# Patient Record
Sex: FEMALE | Race: WHITE | HISPANIC OR LATINO | Employment: FULL TIME
[De-identification: names, ages, dates, MRNs, and addresses within clinical notes are randomized per-mention and may not be internally consistent; named-entity substitution may affect disease eponyms.]

---

## 2017-01-02 ENCOUNTER — HOSPITAL (OUTPATIENT)
Dept: OTHER | Age: 27
End: 2017-01-02
Attending: FAMILY MEDICINE

## 2017-04-15 ENCOUNTER — HOSPITAL (OUTPATIENT)
Dept: OTHER | Age: 27
End: 2017-04-15
Attending: FAMILY MEDICINE

## 2017-04-15 LAB
CHLAMYDIA PROBE: NEGATIVE
CHLAMYDIA/GC SOURCE: NORMAL
CONTROL LINE: PRESENT
CONTROL LINE: PRESENT
GC PROBE: NEGATIVE
Lab: CLEAR
Lab: CLEAR
N GON SOURCE: NORMAL
TRICH POC: NORMAL
UA APPEAR POC: ABNORMAL
UA BILI POC: NEGATIVE
UA BLOOD POC: NEGATIVE
UA COLOR POC: YELLOW
UA GLUCOSE POC: NEGATIVE
UA KETONES POC: NEGATIVE
UA LEUK EST POC: ABNORMAL
UA NITRITE POC: NEGATIVE
UA PH POC: 6 (ref 5–7)
UA PROTEIN POC: NEGATIVE
UA SPEC GRAV POC: 1.02 (ref 1.01–1.02)
UA UROBILIN POC: 0.2 MG/DL
URINE PREG POC: NEGATIVE

## 2017-04-16 ENCOUNTER — HOSPITAL (OUTPATIENT)
Dept: OTHER | Age: 27
End: 2017-04-16
Attending: PHYSICIAN ASSISTANT

## 2017-04-16 LAB
CONTROL LINE: PRESENT
Lab: CLEAR
UA APPEAR: CLEAR
UA BACTERIA: ABNORMAL
UA BILI: NEGATIVE
UA BLOOD: NEGATIVE
UA COLOR: YELLOW
UA EPITHELIAL: ABNORMAL
UA GLUCOSE: NEGATIVE
UA KETONES: ABNORMAL
UA LEUK EST: ABNORMAL
UA NITRITE: NEGATIVE
UA PH: 6.5 (ref 5–7)
UA PROTEIN: NEGATIVE
UA RBC: ABNORMAL
UA SPEC GRAV: 1.02 (ref 1.01–1.02)
UA UROBILINOGEN: 0.2 MG/DL (ref 0.2–1)
UA WBC: ABNORMAL
URINE PREG POC: NEGATIVE

## 2017-09-18 ENCOUNTER — HOSPITAL (OUTPATIENT)
Dept: OTHER | Age: 27
End: 2017-09-18
Attending: FAMILY MEDICINE

## 2017-09-18 LAB
CHLAMYDIA PROBE: NEGATIVE
CHLAMYDIA/GC SOURCE: NORMAL
CONTROL LINE: PRESENT
CONTROL LINE: PRESENT
GC PROBE: NEGATIVE
Lab: CLEAR
Lab: CLEAR
N GON SOURCE: NORMAL
TRICH POC: NORMAL
UA APPEAR POC: ABNORMAL
UA BILI POC: NEGATIVE
UA BLOOD POC: ABNORMAL
UA COLOR POC: YELLOW
UA GLUCOSE POC: NEGATIVE
UA KETONES POC: NEGATIVE
UA LEUK EST POC: ABNORMAL
UA NITRITE POC: NEGATIVE
UA PH POC: 6 (ref 5–7)
UA PROTEIN POC: NEGATIVE
UA SPEC GRAV POC: 1.02 (ref 1.01–1.02)
UA UROBILIN POC: 0.2 MG/DL
URINE PREG POC: NEGATIVE

## 2017-12-29 ENCOUNTER — HOSPITAL (OUTPATIENT)
Dept: OTHER | Age: 27
End: 2017-12-29
Attending: INTERNAL MEDICINE

## 2018-02-11 ENCOUNTER — HOSPITAL (OUTPATIENT)
Dept: OTHER | Age: 28
End: 2018-02-11
Attending: NURSE PRACTITIONER

## 2018-02-11 LAB
CHLAMYDIA PROBE: NEGATIVE
CHLAMYDIA/GC SOURCE: NORMAL
CLUE CELLS: PRESENT
CONTROL LINE: PRESENT
GC PROBE: NEGATIVE
Lab: CLEAR
N GON SOURCE: NORMAL
TRICHOMONAS: ABNORMAL
UA APPEAR: CLEAR
UA BACTERIA: ABNORMAL
UA BILI: NEGATIVE
UA BLOOD: NEGATIVE
UA COLOR: YELLOW
UA EPITHELIAL: ABNORMAL
UA GLUCOSE: NEGATIVE
UA KETONES: NEGATIVE
UA LEUK EST: ABNORMAL
UA NITRITE: NEGATIVE
UA PH: 6 (ref 5–7)
UA PROTEIN: NEGATIVE
UA RBC: ABNORMAL
UA SPEC GRAV: <=1.005 (ref 1.01–1.02)
UA UROBILINOGEN: 0.2 MG/DL (ref 0.2–1)
UA WBC: ABNORMAL
URINE PREG POC: NEGATIVE
WBC, WET PREP: ABNORMAL
YEAST, WET PREP: ABNORMAL

## 2018-07-12 PROBLEM — Z98.890 HISTORY OF LOOP ELECTRICAL EXCISION PROCEDURE (LEEP): Status: ACTIVE | Noted: 2017-08-21

## 2018-07-12 PROBLEM — G43.829 MENSTRUAL MIGRAINE: Status: ACTIVE | Noted: 2018-02-15

## 2018-07-12 PROBLEM — F17.200 SMOKER: Status: ACTIVE | Noted: 2017-10-06

## 2018-07-12 PROBLEM — R87.610 ASCUS OF CERVIX WITH NEGATIVE HIGH RISK HPV: Status: ACTIVE | Noted: 2017-11-16

## 2019-05-20 ENCOUNTER — HOSPITAL (OUTPATIENT)
Dept: OTHER | Age: 29
End: 2019-05-20
Attending: PHYSICIAN ASSISTANT

## 2019-05-20 LAB
A/G RATIO_: 0.8
ABS LYMPH: 1.9 K/CUMM (ref 1–3.5)
ABS MONO: 0.7 K/CUMM (ref 0.1–0.8)
ABS NEUTRO: 5.9 K/CUMM (ref 2–8)
ALBUMIN: 3.5 G/DL (ref 3.5–5)
ALK PHOS: 103 UNIT/L (ref 50–124)
ALT/GPT: 16 UNIT/L (ref 0–55)
ANION GAP SERPL CALC-SCNC: 13 MEQ/L (ref 10–20)
AST/GOT: 14 UNIT/L (ref 5–34)
BASOPHIL: 0 % (ref 0–1)
BILI TOTAL: 0.2 MG/DL (ref 0.2–1)
BUN SERPL-MCNC: 11 MG/DL (ref 6–20)
C DIFF COMMENT: NORMAL
C DIFF TOXIN PCR: NOT DETECTED
CALCIUM: 9.7 MG/DL (ref 8.4–10.2)
CHLORIDE: 106 MEQ/L (ref 97–107)
CONTROL LINE: PRESENT
CONTROL LINE: PRESENT
CREATININE: 0.68 MG/DL (ref 0.6–1.3)
DIFF_TYPE?: NORMAL
EOSINOPHIL: 5 % (ref 0–6)
GLOBULIN_: 4.2 G/DL (ref 2–4.1)
GLUCOSE LVL: 110 MG/DL (ref 70–99)
GROSS: NORMAL
GROSS: NORMAL
HCT VFR BLD CALC: 37 % (ref 33–45)
HEMOLYSIS 2+: NEGATIVE
HGB BLD-MCNC: 12.1 G/DL (ref 11–15)
ICTERIC 4+: NEGATIVE
IMMATURE GRAN: 0.3 % (ref 0–0.3)
INSTR WBC: 8.9 K/CUMM (ref 4–11)
LIPASE LEVEL: 19 UNIT/L (ref 8–78)
LIPEMIC 3+: NEGATIVE
LYMPHOCYTE: 21 %
Lab: CLEAR
MCH RBC QN AUTO: 28 PG (ref 25–35)
MCHC RBC AUTO-ENTMCNC: 33 G/DL (ref 32–37)
MCV RBC AUTO: 85 FL (ref 78–97)
MONOCYTE: 8 %
NEUTROPHIL: 66 %
NRBC BLD MANUAL-RTO: 0 % (ref 0–0.2)
PLATELET: 392 K/CUMM (ref 150–450)
POTASSIUM: 3.8 MEQ/L (ref 3.5–5.1)
RBC # BLD: 4.34 M/CUMM (ref 3.7–5.2)
RDW: 13.1 % (ref 11.5–14.5)
ROTAVIRUS: NEGATIVE
SODIUM: 137 MEQ/L (ref 136–145)
STL COLOR: NORMAL
STOOL RBC: NORMAL
STOOL WBC: NORMAL
TCO2: 22 MEQ/L (ref 19–29)
TOTAL PROTEIN: 7.7 G/DL (ref 6.4–8.3)
UA APPEAR: CLEAR
UA BACTERIA: ABNORMAL
UA BILI: NEGATIVE
UA BLOOD: ABNORMAL
UA COLOR: YELLOW
UA EPITHELIAL: ABNORMAL
UA GLUCOSE: NEGATIVE
UA KETONES: NEGATIVE
UA LEUK EST: NEGATIVE
UA NITRITE: NEGATIVE
UA PH: 6 (ref 5–7)
UA PROTEIN: NEGATIVE
UA RBC: ABNORMAL
UA SPEC GRAV: 1.02 (ref 1.01–1.02)
UA UROBILINOGEN: 0.2 MG/DL (ref 0.2–1)
UA WBC: ABNORMAL
URINE PREG POC: NEGATIVE
WBC # BLD: 8.9 K/CUMM (ref 4–11)

## 2019-08-21 ENCOUNTER — HOSPITAL (OUTPATIENT)
Dept: OTHER | Age: 29
End: 2019-08-21
Attending: EMERGENCY MEDICINE

## 2020-01-05 ENCOUNTER — HOSPITAL (OUTPATIENT)
Dept: OTHER | Age: 30
End: 2020-01-05
Attending: PHYSICIAN ASSISTANT

## 2020-01-05 LAB
CONTROL LINE: PRESENT
Lab: CLEAR
UA APPEAR POC: CLEAR
UA BILI POC: NEGATIVE
UA BLOOD POC: NEGATIVE
UA COLOR POC: YELLOW
UA GLUCOSE POC: NEGATIVE
UA KETONES POC: NEGATIVE
UA LEUK EST POC: NEGATIVE
UA NITRITE POC: NEGATIVE
UA PH POC: 8.5 (ref 5–7)
UA PROTEIN POC: NEGATIVE
UA SPEC GRAV POC: 1.02 (ref 1.01–1.02)
UA UROBILIN POC: 0.2 MG/DL
URINE PREG POC: NEGATIVE

## 2020-03-06 ENCOUNTER — HOSPITAL (OUTPATIENT)
Dept: OTHER | Age: 30
End: 2020-03-06
Attending: NURSE PRACTITIONER

## 2020-03-06 LAB
CHLAMYDIA PROBE: POSITIVE
CONTROL LINE: PRESENT
CONTROL LINE: PRESENT
GC PROBE: NEGATIVE
HSV PCR SOURCE: NORMAL
HSV TYPE 1: NOT DETECTED
HSV TYPE 2: NOT DETECTED
Lab: CLEAR
Lab: CLEAR
N GON SOURCE: ABNORMAL
TRICH POC: NORMAL
UA APPEAR POC: ABNORMAL
UA BILI POC: ABNORMAL
UA BLOOD POC: NEGATIVE
UA COLOR POC: ABNORMAL
UA GLUCOSE POC: NEGATIVE
UA KETONES POC: ABNORMAL
UA LEUK EST POC: NEGATIVE
UA NITRITE POC: NEGATIVE
UA PH POC: 5.5 (ref 5–7)
UA PROTEIN POC: NEGATIVE
UA SPEC GRAV POC: 1.02 (ref 1.01–1.02)
UA UROBILIN POC: 0.2 MG/DL
URINE PREG POC: NEGATIVE

## 2020-11-15 ENCOUNTER — HOSPITAL (OUTPATIENT)
Dept: OTHER | Age: 30
End: 2020-11-15
Attending: INTERNAL MEDICINE

## 2021-01-20 ENCOUNTER — WALK IN (OUTPATIENT)
Dept: URGENT CARE | Age: 31
End: 2021-01-20
Attending: EMERGENCY MEDICINE

## 2021-01-20 DIAGNOSIS — G44.229 CHRONIC TENSION-TYPE HEADACHE, NOT INTRACTABLE: Primary | ICD-10-CM

## 2021-01-20 PROCEDURE — 10002800 HB RX 250 W HCPCS: Performed by: EMERGENCY MEDICINE

## 2021-01-20 PROCEDURE — 99203 OFFICE O/P NEW LOW 30 MIN: CPT

## 2021-01-20 PROCEDURE — 96372 THER/PROPH/DIAG INJ SC/IM: CPT

## 2021-01-20 RX ORDER — HYDROCODONE BITARTRATE AND ACETAMINOPHEN 5; 325 MG/1; MG/1
TABLET ORAL
Qty: 15 TABLET | Refills: 0 | Status: SHIPPED | OUTPATIENT
Start: 2021-01-20

## 2021-01-20 RX ADMIN — KETOROLAC TROMETHAMINE 60 MG: 30 INJECTION, SOLUTION INTRAMUSCULAR; INTRAVENOUS at 12:08

## 2021-01-20 ASSESSMENT — ENCOUNTER SYMPTOMS
ABDOMINAL PAIN: 0
CONFUSION: 0
COUGH: 0
BACK PAIN: 0
CHILLS: 0
SHORTNESS OF BREATH: 0
SINUS PAIN: 0
HEADACHES: 1
VOMITING: 0
NAUSEA: 0
FEVER: 0

## 2021-01-20 ASSESSMENT — PAIN SCALES - GENERAL: PAINLEVEL: 9-10

## 2021-01-30 ENCOUNTER — APPOINTMENT (OUTPATIENT)
Dept: ULTRASOUND IMAGING | Age: 31
End: 2021-01-30

## 2021-01-30 ENCOUNTER — APPOINTMENT (OUTPATIENT)
Dept: CT IMAGING | Age: 31
End: 2021-01-30

## 2021-01-30 ENCOUNTER — HOSPITAL ENCOUNTER (EMERGENCY)
Age: 31
Discharge: HOME | End: 2021-01-30

## 2021-01-30 VITALS
HEART RATE: 80 BPM | TEMPERATURE: 97.5 F | RESPIRATION RATE: 15 BRPM | SYSTOLIC BLOOD PRESSURE: 105 MMHG | OXYGEN SATURATION: 100 % | DIASTOLIC BLOOD PRESSURE: 59 MMHG | BODY MASS INDEX: 38.57 KG/M2 | WEIGHT: 240 LBS | HEIGHT: 66 IN

## 2021-01-30 DIAGNOSIS — K59.00 CONSTIPATION, UNSPECIFIED CONSTIPATION TYPE: ICD-10-CM

## 2021-01-30 DIAGNOSIS — R10.32 LEFT LOWER QUADRANT ABDOMINAL PAIN: Primary | ICD-10-CM

## 2021-01-30 LAB
ALBUMIN SERPL-MCNC: 3.6 G/DL (ref 3.6–5.1)
ALBUMIN/GLOB SERPL: 0.8 {RATIO} (ref 1–2.4)
ALP SERPL-CCNC: 96 UNITS/L (ref 45–117)
ALT SERPL-CCNC: 35 UNITS/L
ANION GAP SERPL CALC-SCNC: 15 MMOL/L (ref 10–20)
APPEARANCE UR: CLEAR
AST SERPL-CCNC: 14 UNITS/L
BACTERIA #/AREA URNS HPF: ABNORMAL /HPF
BASOPHILS # BLD: 0.1 K/MCL (ref 0–0.3)
BASOPHILS NFR BLD: 1 %
BILIRUB SERPL-MCNC: 0.2 MG/DL (ref 0.2–1)
BILIRUB UR QL STRIP: NEGATIVE
BUN SERPL-MCNC: 13 MG/DL (ref 6–20)
BUN/CREAT SERPL: 24 (ref 7–25)
CALCIUM SERPL-MCNC: 8.8 MG/DL (ref 8.4–10.2)
CHLORIDE SERPL-SCNC: 101 MMOL/L (ref 98–107)
CO2 SERPL-SCNC: 27 MMOL/L (ref 21–32)
COLOR UR: YELLOW
CREAT SERPL-MCNC: 0.55 MG/DL (ref 0.51–0.95)
DEPRECATED RDW RBC: 42.1 FL (ref 39–50)
EOSINOPHIL # BLD: 0.5 K/MCL (ref 0–0.5)
EOSINOPHIL NFR BLD: 5 %
ERYTHROCYTE [DISTWIDTH] IN BLOOD: 12.7 % (ref 11–15)
FASTING DURATION TIME PATIENT: ABNORMAL H
GFR SERPLBLD BASED ON 1.73 SQ M-ARVRAT: >90 ML/MIN/1.73M2
GLOBULIN SER-MCNC: 4.6 G/DL (ref 2–4)
GLUCOSE SERPL-MCNC: 88 MG/DL (ref 65–99)
GLUCOSE UR STRIP-MCNC: NEGATIVE MG/DL
HCG UR QL: NEGATIVE
HCT VFR BLD CALC: 42.7 % (ref 36–46.5)
HGB BLD-MCNC: 13.8 G/DL (ref 12–15.5)
HGB UR QL STRIP: ABNORMAL
HYALINE CASTS #/AREA URNS LPF: ABNORMAL /LPF
IMM GRANULOCYTES # BLD AUTO: 0 K/MCL (ref 0–0.2)
IMM GRANULOCYTES # BLD: 0 %
KETONES UR STRIP-MCNC: NEGATIVE MG/DL
LEUKOCYTE ESTERASE UR QL STRIP: NEGATIVE
LIPASE SERPL-CCNC: 62 UNITS/L (ref 73–393)
LYMPHOCYTES # BLD: 3.1 K/MCL (ref 1–4.8)
LYMPHOCYTES NFR BLD: 30 %
MCH RBC QN AUTO: 29.2 PG (ref 26–34)
MCHC RBC AUTO-ENTMCNC: 32.3 G/DL (ref 32–36.5)
MCV RBC AUTO: 90.3 FL (ref 78–100)
MONOCYTES # BLD: 0.8 K/MCL (ref 0.3–0.9)
MONOCYTES NFR BLD: 8 %
NEUTROPHILS # BLD: 6 K/MCL (ref 1.8–7.7)
NEUTROPHILS NFR BLD: 56 %
NITRITE UR QL STRIP: NEGATIVE
NRBC BLD MANUAL-RTO: 0 /100 WBC
PH UR STRIP: 6 [PH] (ref 5–7)
PLATELET # BLD AUTO: 425 K/MCL (ref 140–450)
POTASSIUM SERPL-SCNC: 3.9 MMOL/L (ref 3.4–5.1)
PROT SERPL-MCNC: 8.2 G/DL (ref 6.4–8.2)
PROT UR STRIP-MCNC: NEGATIVE MG/DL
RBC # BLD: 4.73 MIL/MCL (ref 4–5.2)
RBC #/AREA URNS HPF: ABNORMAL /HPF
SODIUM SERPL-SCNC: 139 MMOL/L (ref 135–145)
SP GR UR STRIP: <1.005 (ref 1–1.03)
SQUAMOUS #/AREA URNS HPF: ABNORMAL /HPF
UROBILINOGEN UR STRIP-MCNC: 0.2 MG/DL
WBC # BLD: 10.5 K/MCL (ref 4.2–11)
WBC #/AREA URNS HPF: ABNORMAL /HPF

## 2021-01-30 PROCEDURE — 83690 ASSAY OF LIPASE: CPT | Performed by: NURSE PRACTITIONER

## 2021-01-30 PROCEDURE — 10002807 HB RX 258: Performed by: NURSE PRACTITIONER

## 2021-01-30 PROCEDURE — 84703 CHORIONIC GONADOTROPIN ASSAY: CPT

## 2021-01-30 PROCEDURE — 74177 CT ABD & PELVIS W/CONTRAST: CPT

## 2021-01-30 PROCEDURE — 96374 THER/PROPH/DIAG INJ IV PUSH: CPT

## 2021-01-30 PROCEDURE — 76830 TRANSVAGINAL US NON-OB: CPT

## 2021-01-30 PROCEDURE — 96361 HYDRATE IV INFUSION ADD-ON: CPT

## 2021-01-30 PROCEDURE — 76856 US EXAM PELVIC COMPLETE: CPT

## 2021-01-30 PROCEDURE — 85025 COMPLETE CBC W/AUTO DIFF WBC: CPT | Performed by: NURSE PRACTITIONER

## 2021-01-30 PROCEDURE — 80053 COMPREHEN METABOLIC PANEL: CPT | Performed by: NURSE PRACTITIONER

## 2021-01-30 PROCEDURE — 96375 TX/PRO/DX INJ NEW DRUG ADDON: CPT

## 2021-01-30 PROCEDURE — 81001 URINALYSIS AUTO W/SCOPE: CPT | Performed by: NURSE PRACTITIONER

## 2021-01-30 PROCEDURE — 99284 EMERGENCY DEPT VISIT MOD MDM: CPT

## 2021-01-30 PROCEDURE — 10002805 HB CONTRAST AGENT: Performed by: NURSE PRACTITIONER

## 2021-01-30 PROCEDURE — 10002800 HB RX 250 W HCPCS: Performed by: NURSE PRACTITIONER

## 2021-01-30 RX ORDER — ONDANSETRON 2 MG/ML
4 INJECTION INTRAMUSCULAR; INTRAVENOUS ONCE
Status: COMPLETED | OUTPATIENT
Start: 2021-01-30 | End: 2021-01-30

## 2021-01-30 RX ORDER — POLYETHYLENE GLYCOL 3350 17 G/17G
17 POWDER, FOR SOLUTION ORAL DAILY
Qty: 255 G | Refills: 0 | Status: SHIPPED | OUTPATIENT
Start: 2021-01-30 | End: 2021-02-06

## 2021-01-30 RX ADMIN — ONDANSETRON 4 MG: 2 INJECTION INTRAMUSCULAR; INTRAVENOUS at 14:31

## 2021-01-30 RX ADMIN — IOHEXOL 500 ML: 350 INJECTION, SOLUTION INTRAVENOUS at 15:45

## 2021-01-30 RX ADMIN — SODIUM CHLORIDE 1000 ML: 9 INJECTION, SOLUTION INTRAVENOUS at 14:30

## 2021-01-30 RX ADMIN — MORPHINE SULFATE 4 MG: 4 INJECTION INTRAVENOUS at 14:31

## 2021-01-30 ASSESSMENT — ENCOUNTER SYMPTOMS
FATIGUE: 0
EYE PAIN: 0
NAUSEA: 0
ACTIVITY CHANGE: 0
WOUND: 0
RHINORRHEA: 0
EYE REDNESS: 0
FEVER: 0
ABDOMINAL PAIN: 1
CONSTIPATION: 0
TROUBLE SWALLOWING: 0
SHORTNESS OF BREATH: 0
APPETITE CHANGE: 0
CHEST TIGHTNESS: 0
DIZZINESS: 0
EYE ITCHING: 0
DIARRHEA: 0
BACK PAIN: 0
EYE DISCHARGE: 0
SORE THROAT: 0
VOMITING: 0
CHILLS: 0
HEADACHES: 0
COUGH: 0
COLOR CHANGE: 0

## 2021-01-30 ASSESSMENT — PAIN SCALES - GENERAL
PAINLEVEL_OUTOF10: 7
PAINLEVEL_OUTOF10: 7

## 2021-01-30 ASSESSMENT — PAIN DESCRIPTION - PAIN TYPE: TYPE: ACUTE PAIN

## 2021-04-20 ENCOUNTER — EMPLOYEE HEALTH (OUTPATIENT)
Dept: OTHER | Facility: HOSPITAL | Age: 31
End: 2021-04-20
Attending: PREVENTIVE MEDICINE

## 2021-04-20 DIAGNOSIS — Z11.1 SCREENING-PULMONARY TB: Primary | ICD-10-CM

## 2021-04-20 PROCEDURE — 86480 TB TEST CELL IMMUN MEASURE: CPT

## 2021-05-26 VITALS
DIASTOLIC BLOOD PRESSURE: 84 MMHG | HEART RATE: 78 BPM | WEIGHT: 250 LBS | OXYGEN SATURATION: 99 % | SYSTOLIC BLOOD PRESSURE: 131 MMHG | HEIGHT: 66 IN | TEMPERATURE: 98 F | RESPIRATION RATE: 18 BRPM | BODY MASS INDEX: 40.18 KG/M2

## 2021-06-08 ENCOUNTER — OFFICE VISIT (OUTPATIENT)
Dept: OBGYN CLINIC | Facility: CLINIC | Age: 31
End: 2021-06-08
Payer: COMMERCIAL

## 2021-06-08 VITALS
WEIGHT: 277.63 LBS | DIASTOLIC BLOOD PRESSURE: 70 MMHG | BODY MASS INDEX: 43.57 KG/M2 | HEIGHT: 67 IN | SYSTOLIC BLOOD PRESSURE: 114 MMHG | RESPIRATION RATE: 18 BRPM | HEART RATE: 86 BPM

## 2021-06-08 DIAGNOSIS — Z98.890 HISTORY OF LOOP ELECTRICAL EXCISION PROCEDURE (LEEP): ICD-10-CM

## 2021-06-08 DIAGNOSIS — Z87.42 HX OF MENORRHAGIA: Primary | ICD-10-CM

## 2021-06-08 PROCEDURE — 87591 N.GONORRHOEAE DNA AMP PROB: CPT | Performed by: OBSTETRICS & GYNECOLOGY

## 2021-06-08 PROCEDURE — 3074F SYST BP LT 130 MM HG: CPT | Performed by: OBSTETRICS & GYNECOLOGY

## 2021-06-08 PROCEDURE — 3078F DIAST BP <80 MM HG: CPT | Performed by: OBSTETRICS & GYNECOLOGY

## 2021-06-08 PROCEDURE — 87491 CHLMYD TRACH DNA AMP PROBE: CPT | Performed by: OBSTETRICS & GYNECOLOGY

## 2021-06-08 PROCEDURE — 99203 OFFICE O/P NEW LOW 30 MIN: CPT | Performed by: OBSTETRICS & GYNECOLOGY

## 2021-06-08 PROCEDURE — 3008F BODY MASS INDEX DOCD: CPT | Performed by: OBSTETRICS & GYNECOLOGY

## 2021-06-08 RX ORDER — TRANEXAMIC ACID 650 1/1
1300 TABLET ORAL 3 TIMES DAILY
Qty: 30 TABLET | Refills: 3 | Status: SHIPPED | OUTPATIENT
Start: 2021-06-08 | End: 2021-06-13

## 2021-06-08 RX ORDER — FERROUS SULFATE TAB EC 324 MG (65 MG FE EQUIVALENT) 324 (65 FE) MG
TABLET DELAYED RESPONSE ORAL
COMMUNITY
End: 2021-10-06

## 2021-06-08 NOTE — PROGRESS NOTES
Ronnie Stevens is a 35year old female  Patient's last menstrual period was 2021 (exact date). Patient presents with:  New Patient: vaginal bleeding/clots. per pt, 4 menses in last 2 months  .      She used to have a period every month she will bl use: Never      Sexual activity: Not Currently        Partners: Male    Other Topics      Concerns:         Service: Not Asked        Blood Transfusions: Not Asked        Caffeine Concern: No        Occupational Exposure: Not Asked        Reyes Braga pain, diarrhea or constipation  Genitourinary:  denies dysuria, incontinence, abnormal vaginal discharge, vaginal itching  Skin/Breast:  Denies any breast pain, lumps, or discharge. Neurological:  denies headaches, extremity weakness or numbness.       Cheikh 30

## 2021-06-11 ENCOUNTER — TELEPHONE (OUTPATIENT)
Dept: OBGYN CLINIC | Facility: CLINIC | Age: 31
End: 2021-06-11

## 2021-06-11 NOTE — TELEPHONE ENCOUNTER
Spoke with patient. She wanted to know the results of her CBC and TSH. I informed her she has not had them drawn. She states her visit was extremely confusing and odd. She states she is celibate and told the doctor she did not need a pap or STD testing.  Sh

## 2021-06-29 ENCOUNTER — TELEPHONE (OUTPATIENT)
Dept: OBGYN CLINIC | Facility: CLINIC | Age: 31
End: 2021-06-29

## 2021-06-29 NOTE — TELEPHONE ENCOUNTER
Pt states that she is having two periods a month and feels like dr. Eh Mcdonald did not help her with her issue, She also states that her pain is becoming unbearable. She would like to be seen ASAP and also wants to discuss the medication she was prescribed.

## 2021-06-29 NOTE — TELEPHONE ENCOUNTER
Pt questions answered. Pt requests appointment with Dr. Samir Jordan, she came highly recommended by coworker. First available offered and scheduled. Pt advised to continue Lysteda to stop bleeding as prescribed. Understanding verbalized.

## 2021-07-21 ENCOUNTER — WALK IN (OUTPATIENT)
Dept: URGENT CARE | Age: 31
End: 2021-07-21

## 2021-07-21 VITALS
SYSTOLIC BLOOD PRESSURE: 114 MMHG | HEART RATE: 72 BPM | RESPIRATION RATE: 16 BRPM | HEIGHT: 66 IN | BODY MASS INDEX: 41.78 KG/M2 | TEMPERATURE: 98.3 F | OXYGEN SATURATION: 97 % | WEIGHT: 260 LBS | DIASTOLIC BLOOD PRESSURE: 82 MMHG

## 2021-07-21 DIAGNOSIS — H61.23 EXCESSIVE CERUMEN IN BOTH EAR CANALS: Primary | ICD-10-CM

## 2021-07-21 PROCEDURE — 99212 OFFICE O/P EST SF 10 MIN: CPT | Performed by: PHYSICIAN ASSISTANT

## 2021-07-21 ASSESSMENT — ENCOUNTER SYMPTOMS
SORE THROAT: 0
RHINORRHEA: 0
CHILLS: 0
FEVER: 0
HEADACHES: 0

## 2021-07-22 ENCOUNTER — HOSPITAL ENCOUNTER (OUTPATIENT)
Age: 31
Discharge: HOME | End: 2021-07-22
Attending: EMERGENCY MEDICINE
Payer: COMMERCIAL

## 2021-07-22 VITALS
TEMPERATURE: 98 F | OXYGEN SATURATION: 97 % | SYSTOLIC BLOOD PRESSURE: 120 MMHG | DIASTOLIC BLOOD PRESSURE: 84 MMHG | HEART RATE: 84 BPM | RESPIRATION RATE: 16 BRPM

## 2021-07-22 DIAGNOSIS — N76.0 VAGINITIS AND VULVOVAGINITIS: Primary | ICD-10-CM

## 2021-07-22 LAB
B-HCG UR QL: NEGATIVE
B-HCG UR QL: NEGATIVE
POCT BILIRUBIN URINE: NEGATIVE
POCT GLUCOSE URINE: NEGATIVE MG/DL
POCT KETONE URINE: NEGATIVE MG/DL
POCT NITRITE URINE: NEGATIVE
POCT PH URINE: 6.5 (ref 5–8)
POCT PROTEIN URINE: NEGATIVE MG/DL
POCT SPECIFIC GRAVITY URINE: 1.01
POCT URINE CLARITY: CLEAR
POCT URINE COLOR: YELLOW
POCT UROBILINOGEN URINE: 0.2 MG/DL

## 2021-07-22 PROCEDURE — 87491 CHLMYD TRACH DNA AMP PROBE: CPT | Performed by: EMERGENCY MEDICINE

## 2021-07-22 PROCEDURE — 87660 TRICHOMONAS VAGIN DIR PROBE: CPT | Performed by: EMERGENCY MEDICINE

## 2021-07-22 PROCEDURE — 87480 CANDIDA DNA DIR PROBE: CPT | Performed by: EMERGENCY MEDICINE

## 2021-07-22 PROCEDURE — 87591 N.GONORRHOEAE DNA AMP PROB: CPT | Performed by: EMERGENCY MEDICINE

## 2021-07-22 PROCEDURE — 87510 GARDNER VAG DNA DIR PROBE: CPT | Performed by: EMERGENCY MEDICINE

## 2021-07-22 PROCEDURE — 81002 URINALYSIS NONAUTO W/O SCOPE: CPT | Performed by: EMERGENCY MEDICINE

## 2021-07-22 PROCEDURE — 81025 URINE PREGNANCY TEST: CPT

## 2021-07-22 PROCEDURE — 99203 OFFICE O/P NEW LOW 30 MIN: CPT

## 2021-07-22 PROCEDURE — 99213 OFFICE O/P EST LOW 20 MIN: CPT

## 2021-07-22 RX ORDER — METRONIDAZOLE 500 MG/1
500 TABLET ORAL 2 TIMES DAILY
Qty: 30 TABLET | Refills: 0 | Status: SHIPPED | OUTPATIENT
Start: 2021-07-22 | End: 2021-07-29

## 2021-07-22 RX ORDER — FLUCONAZOLE 150 MG/1
150 TABLET ORAL ONCE
Qty: 1 TABLET | Refills: 0 | Status: SHIPPED | OUTPATIENT
Start: 2021-07-22 | End: 2021-07-22

## 2021-07-22 NOTE — ED PROVIDER NOTES
Patient Seen in: [facility]      History   Patient presents with:  Eval-G    Stated Complaint: eval g / discharge    HPI/Subjective:   HPI    Antonio Turner is a pleasant 60-year-old female presenting with eval G.   Patient states that she had unpro normal limits   POCT PREGNANCY URINE - Normal   CHLAMYDIA/GONOCOCCUS, ISIAH   VAGINITIS/VAGINOSIS, DNA PROBE                   MDM   Skin amount of whitish-yellowish discharge but no cervical motion tenderness no adnexal tenderness.   Patient was started cour

## 2021-07-23 LAB
C TRACH DNA SPEC QL NAA+PROBE: NEGATIVE
N GONORRHOEA DNA SPEC QL NAA+PROBE: NEGATIVE

## 2021-08-10 ENCOUNTER — OFFICE VISIT (OUTPATIENT)
Dept: INTERNAL MEDICINE CLINIC | Facility: CLINIC | Age: 31
End: 2021-08-10
Payer: COMMERCIAL

## 2021-08-10 VITALS
RESPIRATION RATE: 16 BRPM | BODY MASS INDEX: 42.11 KG/M2 | WEIGHT: 262 LBS | DIASTOLIC BLOOD PRESSURE: 84 MMHG | HEART RATE: 88 BPM | HEIGHT: 66 IN | SYSTOLIC BLOOD PRESSURE: 130 MMHG

## 2021-08-10 DIAGNOSIS — Z51.81 ENCOUNTER FOR THERAPEUTIC DRUG MONITORING: Primary | ICD-10-CM

## 2021-08-10 DIAGNOSIS — G43.909 MIGRAINE WITHOUT STATUS MIGRAINOSUS, NOT INTRACTABLE, UNSPECIFIED MIGRAINE TYPE: ICD-10-CM

## 2021-08-10 DIAGNOSIS — E66.01 MORBID OBESITY WITH BMI OF 40.0-44.9, ADULT (HCC): ICD-10-CM

## 2021-08-10 PROCEDURE — 3075F SYST BP GE 130 - 139MM HG: CPT | Performed by: NURSE PRACTITIONER

## 2021-08-10 PROCEDURE — 3008F BODY MASS INDEX DOCD: CPT | Performed by: NURSE PRACTITIONER

## 2021-08-10 PROCEDURE — 99203 OFFICE O/P NEW LOW 30 MIN: CPT | Performed by: NURSE PRACTITIONER

## 2021-08-10 PROCEDURE — 93000 ELECTROCARDIOGRAM COMPLETE: CPT | Performed by: NURSE PRACTITIONER

## 2021-08-10 PROCEDURE — 3079F DIAST BP 80-89 MM HG: CPT | Performed by: NURSE PRACTITIONER

## 2021-08-10 RX ORDER — TOPIRAMATE 25 MG/1
25 TABLET ORAL 2 TIMES DAILY
Qty: 60 TABLET | Refills: 1 | Status: SHIPPED | OUTPATIENT
Start: 2021-08-10 | End: 2021-09-09

## 2021-08-10 RX ORDER — PHENTERMINE HYDROCHLORIDE 15 MG/1
15 CAPSULE ORAL EVERY MORNING
Qty: 30 CAPSULE | Refills: 1 | Status: SHIPPED | OUTPATIENT
Start: 2021-08-10 | End: 2021-10-06 | Stop reason: DRUGHIGH

## 2021-08-10 NOTE — PATIENT INSTRUCTIONS
Welcome to the [facility]...your Lifestyle Renovation begins now! Thank you for placing your trust in our health care team, I look forward to working with you along this journey to better health!     Next steps:     1.  Sched or too little which would hinder weight loss. Additionally this will help to see your daily carbohydrate intake.  When you set the brina up chose 1-2 lbs/week as a goal.  Keeping a paper food journal is an option as well to remain accountable for your choices

## 2021-08-10 NOTE — PROGRESS NOTES
HISTORY OF PRESENT ILLNESS  Patient presents with:  Weight Problem: co worker referral. no weight loss meds.  swimming to lose weight started at [facility] [address] is a 28year old female new to our office today for initiation of medical weight loss progr Apnea hx: negative  Cancer hx: negative  Family or personal history of Pancreatic issues / Medullary Thyroid Cancer: negative  History of bariatric surgery: negative    [address] reviewed: obesity in parent/s or sibling: yes    REVIEW OF SYSTEMS  GENERAL: feels ALB, GLOBULT, GLOBULIN, AGRATIO, ALBGLOBRAT, NA, K, CL, CO2  No results found for: EAG, A1C  No results found for: CHOLEST, TRIG, HDL, LDL, VLDL, TCHDLRATIO, NONHDLC, CHOLHDLRATIO, NONHDLC, CALCNONHDL  No results found for: T4F, TSH, TSHT4  No results foun type  - topamax good option for migraines as well as cravings  -     OP REFERRAL TO [facility] ([facility] USE ONLY)  -     topiramate 25 MG Oral Tab; Take 1 tablet (25 mg total) by mouth 2 (two) times daily. Refer to discharge instructions for dosing. well as rice, pasta, potatoes and bread and make sure to choose whole grain options when having them with just 1 serving per meal about the size of your inner palm. Daily carbohydrate recommendation to start: 125 grams.   4.  Consume non starchy veggies tarsha journey! Return in about 1 month (around 9/10/2021) for weight management. Patient verbalizes understanding.     Brianna Armstrong, APRN  8/10/2021

## 2021-08-30 ENCOUNTER — LAB ENCOUNTER (OUTPATIENT)
Dept: LAB | Facility: HOSPITAL | Age: 31
End: 2021-08-30
Attending: ADVANCED PRACTICE MIDWIFE
Payer: COMMERCIAL

## 2021-08-30 ENCOUNTER — OFFICE VISIT (OUTPATIENT)
Dept: OBGYN CLINIC | Facility: CLINIC | Age: 31
End: 2021-08-30
Payer: COMMERCIAL

## 2021-08-30 VITALS — BODY MASS INDEX: 39.7 KG/M2 | HEIGHT: 66 IN | WEIGHT: 247 LBS

## 2021-08-30 DIAGNOSIS — Z11.3 SCREENING EXAMINATION FOR STD (SEXUALLY TRANSMITTED DISEASE): ICD-10-CM

## 2021-08-30 DIAGNOSIS — N89.8 VAGINAL DISCHARGE: Primary | ICD-10-CM

## 2021-08-30 DIAGNOSIS — R10.2 PELVIC PAIN: ICD-10-CM

## 2021-08-30 LAB
HBV SURFACE AG SER-ACNC: <0.1 [IU]/L
HBV SURFACE AG SERPL QL IA: NONREACTIVE
HCV AB SERPL QL IA: NONREACTIVE

## 2021-08-30 PROCEDURE — 86780 TREPONEMA PALLIDUM: CPT

## 2021-08-30 PROCEDURE — 86803 HEPATITIS C AB TEST: CPT

## 2021-08-30 PROCEDURE — 87591 N.GONORRHOEAE DNA AMP PROB: CPT

## 2021-08-30 PROCEDURE — 3008F BODY MASS INDEX DOCD: CPT | Performed by: ADVANCED PRACTICE MIDWIFE

## 2021-08-30 PROCEDURE — 87340 HEPATITIS B SURFACE AG IA: CPT

## 2021-08-30 PROCEDURE — 87491 CHLMYD TRACH DNA AMP PROBE: CPT

## 2021-08-30 PROCEDURE — 36415 COLL VENOUS BLD VENIPUNCTURE: CPT

## 2021-08-30 PROCEDURE — 99213 OFFICE O/P EST LOW 20 MIN: CPT | Performed by: ADVANCED PRACTICE MIDWIFE

## 2021-08-30 PROCEDURE — 87389 HIV-1 AG W/HIV-1&-2 AB AG IA: CPT

## 2021-08-30 RX ORDER — METRONIDAZOLE 500 MG/1
500 TABLET ORAL 2 TIMES DAILY
Qty: 14 TABLET | Refills: 0 | Status: SHIPPED | OUTPATIENT
Start: 2021-08-30 | End: 2021-09-06

## 2021-08-31 LAB
C TRACH DNA SPEC QL NAA+PROBE: NEGATIVE
N GONORRHOEA DNA SPEC QL NAA+PROBE: NEGATIVE

## 2021-09-01 ENCOUNTER — TELEPHONE (OUTPATIENT)
Dept: OBGYN CLINIC | Facility: CLINIC | Age: 31
End: 2021-09-01

## 2021-09-01 LAB
GENITAL VAGINOSIS SCREEN: NEGATIVE
T PALLIDUM AB SER QL: NEGATIVE
TRICHOMONAS SCREEN: NEGATIVE

## 2021-09-01 NOTE — PROGRESS NOTES
HPI:   Iker Adjutant is a 28year old female who presents for a gyne exam. Patient presents with:  Gyn Exam: STD screening positive BV on 07/22/21 states she did not finish her antibiotics and having abdominal pain     Worried as her partner was using crack discharge no itching, dyspareunia, reports periods regular   MUSCULOSKELETAL: denies back pain  PSYCHE: denies depression or anxiety, denies exposure to violence or abuse.     EXAM:   Ht 5' 6\" (1.676 m)   Wt 247 lb (112 kg)   LMP 08/20/2021   BMI 39.87 kg/

## 2021-09-01 NOTE — TELEPHONE ENCOUNTER
----- Message from Rigo Sorenson CNM sent at 9/1/2021  9:31 AM CDT -----  Normal std screen including HIV. Please call to inform.

## 2021-09-02 ENCOUNTER — HOSPITAL ENCOUNTER (OUTPATIENT)
Dept: ULTRASOUND IMAGING | Facility: HOSPITAL | Age: 31
Discharge: HOME | End: 2021-09-02
Attending: ADVANCED PRACTICE MIDWIFE
Payer: COMMERCIAL

## 2021-09-02 ENCOUNTER — OFFICE VISIT (OUTPATIENT)
Dept: OBGYN CLINIC | Facility: CLINIC | Age: 31
End: 2021-09-02
Payer: COMMERCIAL

## 2021-09-02 VITALS — DIASTOLIC BLOOD PRESSURE: 81 MMHG | SYSTOLIC BLOOD PRESSURE: 116 MMHG | HEART RATE: 82 BPM | TEMPERATURE: 98 F

## 2021-09-02 DIAGNOSIS — Z32.02 PREGNANCY EXAMINATION OR TEST, NEGATIVE RESULT: ICD-10-CM

## 2021-09-02 DIAGNOSIS — R10.2 PELVIC PAIN: ICD-10-CM

## 2021-09-02 DIAGNOSIS — R39.9 UTI SYMPTOMS: Primary | ICD-10-CM

## 2021-09-02 DIAGNOSIS — Z11.3 SCREENING EXAMINATION FOR SEXUALLY TRANSMITTED DISEASE: ICD-10-CM

## 2021-09-02 LAB
APPEARANCE: CLEAR
BILIRUBIN: NEGATIVE
CONTROL LINE PRESENT WITH A CLEAR BACKGROUND (YES/NO): YES YES/NO
GLUCOSE (URINE DIPSTICK): NEGATIVE MG/DL
KETONES (URINE DIPSTICK): NEGATIVE MG/DL
MULTISTIX LOT#: ABNORMAL NUMERIC
NITRITE, URINE: NEGATIVE
PH, URINE: 6.5 (ref 4.5–8)
PREGNANCY TEST, URINE: NEGATIVE
PROTEIN (URINE DIPSTICK): NEGATIVE MG/DL
SPECIFIC GRAVITY: 1 (ref 1–1.03)
URINE-COLOR: YELLOW
UROBILINOGEN,SEMI-QN: 0.2 MG/DL (ref 0–1.9)

## 2021-09-02 PROCEDURE — 3079F DIAST BP 80-89 MM HG: CPT | Performed by: ADVANCED PRACTICE MIDWIFE

## 2021-09-02 PROCEDURE — 3074F SYST BP LT 130 MM HG: CPT | Performed by: ADVANCED PRACTICE MIDWIFE

## 2021-09-02 PROCEDURE — 76856 US EXAM PELVIC COMPLETE: CPT | Performed by: ADVANCED PRACTICE MIDWIFE

## 2021-09-02 PROCEDURE — 99213 OFFICE O/P EST LOW 20 MIN: CPT | Performed by: ADVANCED PRACTICE MIDWIFE

## 2021-09-02 PROCEDURE — 93975 VASCULAR STUDY: CPT | Performed by: ADVANCED PRACTICE MIDWIFE

## 2021-09-02 PROCEDURE — 76830 TRANSVAGINAL US NON-OB: CPT | Performed by: ADVANCED PRACTICE MIDWIFE

## 2021-09-02 PROCEDURE — 81002 URINALYSIS NONAUTO W/O SCOPE: CPT | Performed by: ADVANCED PRACTICE MIDWIFE

## 2021-09-02 PROCEDURE — 81025 URINE PREGNANCY TEST: CPT | Performed by: ADVANCED PRACTICE MIDWIFE

## 2021-09-02 RX ORDER — SULFAMETHOXAZOLE AND TRIMETHOPRIM 800; 160 MG/1; MG/1
1 TABLET ORAL 2 TIMES DAILY
Qty: 6 TABLET | Refills: 0 | Status: SHIPPED | OUTPATIENT
Start: 2021-09-02 | End: 2021-09-05

## 2021-09-02 NOTE — PROGRESS NOTES
HPI/Subjective:   Patient ID: Cleopatra Montano is a 28year old female. Lloyd Must presents with concern for burning with urination, vaginal burning and pelvic pain. She was last seen on Monday and had negative GC/CT and vaginosis swab at that time.  States that Tu pain, urethral swelling or urethral lesion. Vagina: No signs of injury and foreign body. Vaginal discharge (yellow discharge present) present. No erythema, tenderness, bleeding, lesions or prolapsed vaginal walls.       Cervix: No cervical motion tende

## 2021-09-03 ENCOUNTER — TELEPHONE (OUTPATIENT)
Dept: OBGYN CLINIC | Facility: CLINIC | Age: 31
End: 2021-09-03

## 2021-09-03 NOTE — TELEPHONE ENCOUNTER
Lmtcb, results pending. We will contact pt once we have received results & the midwife has reviewed them.  PADMINI sent Lea Hurtado message to pt which is unread

## 2021-09-03 NOTE — TELEPHONE ENCOUNTER
Patient calling back stating symptoms seem worse today. Told patient results will be given asap after Austin Gentile reviews them.      Patient understood

## 2021-09-03 NOTE — TELEPHONE ENCOUNTER
Pt saw Renetta Almaguer yesterday wants to know what test she ordered and the results.  Pt went to labs yesterday

## 2021-09-04 LAB
C TRACH DNA SPEC QL NAA+PROBE: NEGATIVE
GENITAL VAGINOSIS SCREEN: NEGATIVE
N GONORRHOEA DNA SPEC QL NAA+PROBE: NEGATIVE
TRICHOMONAS SCREEN: NEGATIVE

## 2021-09-04 NOTE — TELEPHONE ENCOUNTER
Pt states she has been taking the Flagyl and Bactrim as directed. Pt is not getting any relief from her symptoms. Pt c/o pain with urination and burning in her vagina. Pt is rating the pain at an 8/10. Pt is also having pain when she is not urinating. P

## 2021-09-05 ENCOUNTER — WALK IN (OUTPATIENT)
Dept: URGENT CARE | Age: 31
End: 2021-09-05
Attending: INTERNAL MEDICINE

## 2021-09-05 VITALS
DIASTOLIC BLOOD PRESSURE: 70 MMHG | OXYGEN SATURATION: 100 % | RESPIRATION RATE: 18 BRPM | SYSTOLIC BLOOD PRESSURE: 97 MMHG | HEART RATE: 76 BPM

## 2021-09-05 DIAGNOSIS — S39.93XA VAGINAL INJURY, INITIAL ENCOUNTER: ICD-10-CM

## 2021-09-05 DIAGNOSIS — R10.2 VAGINAL PAIN: ICD-10-CM

## 2021-09-05 DIAGNOSIS — M54.9 BACK PAIN, UNSPECIFIED BACK LOCATION, UNSPECIFIED BACK PAIN LATERALITY, UNSPECIFIED CHRONICITY: Primary | ICD-10-CM

## 2021-09-05 PROBLEM — F41.9 ANXIETY: Status: ACTIVE | Noted: 2021-09-05

## 2021-09-05 LAB
APPEARANCE, POC: CLEAR
B-HCG UR QL: NEGATIVE
BILIRUBIN, POC: NEGATIVE
COLOR, POC: YELLOW
GLUCOSE UR-MCNC: NEGATIVE MG/DL
INTERNAL PROCEDURAL CONTROLS ACCEPTABLE: NO
KETONES, POC: NEGATIVE MG/DL
NITRITE, POC: NEGATIVE
OCCULT BLOOD, POC: NEGATIVE
PH UR: 6 [PH] (ref 5–7)
PROT UR-MCNC: NEGATIVE MG/DL
SP GR UR: 1.01 (ref 1–1.03)
UROBILINOGEN UR-MCNC: 0.2 MG/DL (ref 0–1)
WBC (LEUKOCYTE) ESTERASE, POC: NEGATIVE

## 2021-09-05 PROCEDURE — 99213 OFFICE O/P EST LOW 20 MIN: CPT

## 2021-09-05 PROCEDURE — 87480 CANDIDA DNA DIR PROBE: CPT | Performed by: INTERNAL MEDICINE

## 2021-09-05 PROCEDURE — 87491 CHLMYD TRACH DNA AMP PROBE: CPT | Performed by: INTERNAL MEDICINE

## 2021-09-05 PROCEDURE — 81003 URINALYSIS AUTO W/O SCOPE: CPT | Performed by: INTERNAL MEDICINE

## 2021-09-05 PROCEDURE — 81025 URINE PREGNANCY TEST: CPT | Performed by: INTERNAL MEDICINE

## 2021-09-05 ASSESSMENT — ENCOUNTER SYMPTOMS
APPETITE CHANGE: 0
FEVER: 0
NAUSEA: 0
BACK PAIN: 0
CHILLS: 0
RECTAL PAIN: 1
VOMITING: 0
EYE ITCHING: 0
DIZZINESS: 0
FATIGUE: 0
DIARRHEA: 0
FACIAL SWELLING: 0
LIGHT-HEADEDNESS: 0

## 2021-09-07 LAB
C TRACH RRNA UR QL NAA+PROBE: NEGATIVE
CANDIDA RRNA VAG QL PROBE: NEGATIVE
G VAGINALIS RRNA GENITAL QL PROBE: NEGATIVE
Lab: NORMAL
N GONORRHOEA RRNA UR QL NAA+PROBE: NEGATIVE
T VAGINALIS RRNA GENITAL QL PROBE: NEGATIVE

## 2021-09-09 ENCOUNTER — OFFICE VISIT (OUTPATIENT)
Dept: INTERNAL MEDICINE CLINIC | Facility: CLINIC | Age: 31
End: 2021-09-09
Payer: COMMERCIAL

## 2021-09-09 VITALS
DIASTOLIC BLOOD PRESSURE: 80 MMHG | SYSTOLIC BLOOD PRESSURE: 118 MMHG | BODY MASS INDEX: 39.7 KG/M2 | HEIGHT: 66 IN | HEART RATE: 80 BPM | RESPIRATION RATE: 16 BRPM | WEIGHT: 247 LBS

## 2021-09-09 DIAGNOSIS — G43.909 MIGRAINE WITHOUT STATUS MIGRAINOSUS, NOT INTRACTABLE, UNSPECIFIED MIGRAINE TYPE: ICD-10-CM

## 2021-09-09 DIAGNOSIS — Z51.81 ENCOUNTER FOR THERAPEUTIC DRUG MONITORING: Primary | ICD-10-CM

## 2021-09-09 DIAGNOSIS — E66.01 MORBID OBESITY WITH BMI OF 40.0-44.9, ADULT (HCC): ICD-10-CM

## 2021-09-09 PROCEDURE — 3079F DIAST BP 80-89 MM HG: CPT | Performed by: NURSE PRACTITIONER

## 2021-09-09 PROCEDURE — 99213 OFFICE O/P EST LOW 20 MIN: CPT | Performed by: NURSE PRACTITIONER

## 2021-09-09 PROCEDURE — 3008F BODY MASS INDEX DOCD: CPT | Performed by: NURSE PRACTITIONER

## 2021-09-09 PROCEDURE — 3074F SYST BP LT 130 MM HG: CPT | Performed by: NURSE PRACTITIONER

## 2021-09-09 RX ORDER — TOPIRAMATE 25 MG/1
25 TABLET ORAL 2 TIMES DAILY
Qty: 60 TABLET | Refills: 1 | Status: CANCELLED | OUTPATIENT
Start: 2021-09-09

## 2021-09-09 RX ORDER — PHENTERMINE HYDROCHLORIDE 15 MG/1
15 CAPSULE ORAL EVERY MORNING
Qty: 30 CAPSULE | Refills: 1 | Status: CANCELLED | OUTPATIENT
Start: 2021-09-09

## 2021-09-09 RX ORDER — TOPIRAMATE 25 MG/1
1 CAPSULE, EXTENDED RELEASE ORAL DAILY
Qty: 30 CAPSULE | Refills: 1 | Status: SHIPPED | OUTPATIENT
Start: 2021-09-09 | End: 2021-10-06

## 2021-09-09 NOTE — PROGRESS NOTES
Judit Hernandez is a 28year old female presents today for 1 month follow-up on medical weight loss program for the treatment of overweight, obesity, or morbid obesity.     S:  Current weight Wt Readings from Last 6 Encounters:  09/09/21 : 247 lb (112 kg)  08/3 normal S1 and S2 without clicks or gallops, no pedal edema.   GI: +BS  NEURO/MS: motor and sensory grossly intact  PSYCH: pleasant, cooperative, normal mood and affect    ASSESSMENT AND PLAN:  Reviewed Initial Weight Data and Goal Weight Loss:       Encount 1/4 plate- protein, 1/2 plate non starchy veggie preferred over fruit  3. Water with all meals  4.  Eliminate/reduce late night eating after 7pm.              Re-thinking Nutrition: Learning to See Food as Fuel  October 8, 2019 • Posted in Jerome Salazar, Nutrition • happiness. Building Blocks of Good Nutrition  A diet without enough fiber, protein and healthy fats can cause your cells to become brittle, leaky and tired.  When cells can't do what they are designed to do, problems like inflammation, cancer and other dif

## 2021-09-09 NOTE — PATIENT INSTRUCTIONS
Continue making lifestyle changes that focus on good nutrition, regular exercise and stress management. Medication Plan: Continue current phentermine dose and add Trokendi XR daily. This has 80% less side effects in comparison to topamax.      Next steps minerals, fats, protein and other nutrients. These essential nutrients matter greatly to every single cell in your body.  They make up your:  • Cell membrane  • Nucleus  • Mitochondria  When cells join together, they make tissue that brings life to your felicity

## 2021-09-13 ENCOUNTER — TELEPHONE (OUTPATIENT)
Dept: INTERNAL MEDICINE CLINIC | Facility: CLINIC | Age: 31
End: 2021-09-13

## 2021-09-13 NOTE — TELEPHONE ENCOUNTER
Pt. Calling in regards to Medication   The patient is not able to  medication due to PA for medication Topirmate

## 2021-09-14 NOTE — TELEPHONE ENCOUNTER
Pt called to check status on pa for medication     Pt states ins is requesting her provider call them to discuss medication     Pt is requesting a follow up call asap

## 2021-09-17 NOTE — TELEPHONE ENCOUNTER
Called [phone] to initiate PA request for Trokendi XR  25, PT id # [id number]      However stopped topamax d/t paresthesia.  Topamax was helping to control migraines and cravings.     Dx G43.909  Tried topamax 25mg  8/10/21-09/9/21    Awaiting for fo

## 2021-10-06 ENCOUNTER — OFFICE VISIT (OUTPATIENT)
Dept: INTERNAL MEDICINE CLINIC | Facility: CLINIC | Age: 31
End: 2021-10-06
Payer: COMMERCIAL

## 2021-10-06 VITALS
SYSTOLIC BLOOD PRESSURE: 122 MMHG | OXYGEN SATURATION: 100 % | RESPIRATION RATE: 18 BRPM | DIASTOLIC BLOOD PRESSURE: 86 MMHG | BODY MASS INDEX: 39.12 KG/M2 | HEART RATE: 94 BPM | WEIGHT: 243.38 LBS | HEIGHT: 66 IN

## 2021-10-06 DIAGNOSIS — G43.909 MIGRAINE WITHOUT STATUS MIGRAINOSUS, NOT INTRACTABLE, UNSPECIFIED MIGRAINE TYPE: ICD-10-CM

## 2021-10-06 DIAGNOSIS — E66.01 MORBID OBESITY WITH BMI OF 40.0-44.9, ADULT (HCC): ICD-10-CM

## 2021-10-06 DIAGNOSIS — Z51.81 ENCOUNTER FOR THERAPEUTIC DRUG MONITORING: Primary | ICD-10-CM

## 2021-10-06 PROCEDURE — 3008F BODY MASS INDEX DOCD: CPT | Performed by: NURSE PRACTITIONER

## 2021-10-06 PROCEDURE — 3074F SYST BP LT 130 MM HG: CPT | Performed by: NURSE PRACTITIONER

## 2021-10-06 PROCEDURE — 3079F DIAST BP 80-89 MM HG: CPT | Performed by: NURSE PRACTITIONER

## 2021-10-06 PROCEDURE — 99213 OFFICE O/P EST LOW 20 MIN: CPT | Performed by: NURSE PRACTITIONER

## 2021-10-06 RX ORDER — PHENTERMINE HYDROCHLORIDE 37.5 MG/1
37.5 TABLET ORAL EVERY MORNING
Qty: 30 TABLET | Refills: 1 | Status: SHIPPED | OUTPATIENT
Start: 2021-10-06 | End: 2021-11-18 | Stop reason: ALTCHOICE

## 2021-10-06 NOTE — PROGRESS NOTES
Algis Meigs is a 28year old female presents today for 2 month follow-up on medical weight loss program for the treatment of overweight, obesity, or morbid obesity.     S:  Current weight Wt Readings from Last 6 Encounters:  10/06/21 : 243 lb 6 oz (110.4 kg nourished, in no apparent distress, obese  EYES: conjunctiva pink, sclera non icteric, PERRLA  LUNGS: CTA in all fields, breathing non labored  CARDIO: RRR without murmur, normal S1 and S2 without clicks or gallops, no pedal edema.   GI: +BS  NEURO/MS: karin below on mindful eating and portions. Complete fasting labs at [facility]. Learning About Serving and Portion Sizes     A good rule of thumb: Devote half your plate to vegetables and green salad.  Split the other half between protein and starchy carbohydrate than one serving of a food at once. But to keep from eating too much at one meal, learn how to manage your portions. A portion is the amount of each type of food on your plate. See the plate diagram for an example of balanced portions.        Ounces: 2 to 3 release the flavor of foods. Let food sit on your tongue. This allows your taste buds to absorb the flavor and transmit messages about your appetite to your brain. Talk or eat: When you are talking, stop eating. When you are eating, stop talking.     Sta

## 2021-10-06 NOTE — PATIENT INSTRUCTIONS
Continue making lifestyle changes that focus on good nutrition, regular exercise and stress management. Medication Plan: Increase phentermine to 37.5 mg daily.      Next steps to work on before next office visit include: Weekends can sabotage your health for a snack or a meal, keep servings in mind. If you don’t have measuring cups or a scale handy, there are ways to [facility] serving sizes, such as comparing your food to the size of your hand (see pictures above).   Managing portion sizes  If your weight i foods that you do not find satisfying or enjoyable. Eating that way will make you feel like you are on a diet. Look before you eat: Before you eat, look at your food, its portion size, and presentation. Breathe deeply.  Look again before taking a mouthfu

## 2021-11-17 ENCOUNTER — OFFICE VISIT (OUTPATIENT)
Dept: FAMILY MEDICINE CLINIC | Facility: CLINIC | Age: 31
End: 2021-11-17
Payer: COMMERCIAL

## 2021-11-17 VITALS
HEIGHT: 66 IN | HEART RATE: 98 BPM | OXYGEN SATURATION: 97 % | DIASTOLIC BLOOD PRESSURE: 78 MMHG | WEIGHT: 230 LBS | BODY MASS INDEX: 36.96 KG/M2 | TEMPERATURE: 98 F | RESPIRATION RATE: 20 BRPM | SYSTOLIC BLOOD PRESSURE: 117 MMHG

## 2021-11-17 DIAGNOSIS — H92.02 LEFT EAR PAIN: Primary | ICD-10-CM

## 2021-11-17 DIAGNOSIS — Z23 NEED FOR INFLUENZA VACCINATION: ICD-10-CM

## 2021-11-17 PROCEDURE — 3078F DIAST BP <80 MM HG: CPT | Performed by: NURSE PRACTITIONER

## 2021-11-17 PROCEDURE — 99213 OFFICE O/P EST LOW 20 MIN: CPT | Performed by: NURSE PRACTITIONER

## 2021-11-17 PROCEDURE — 90471 IMMUNIZATION ADMIN: CPT | Performed by: NURSE PRACTITIONER

## 2021-11-17 PROCEDURE — 90686 IIV4 VACC NO PRSV 0.5 ML IM: CPT | Performed by: NURSE PRACTITIONER

## 2021-11-17 PROCEDURE — 3008F BODY MASS INDEX DOCD: CPT | Performed by: NURSE PRACTITIONER

## 2021-11-17 PROCEDURE — 3074F SYST BP LT 130 MM HG: CPT | Performed by: NURSE PRACTITIONER

## 2021-11-17 RX ORDER — AMOXICILLIN AND CLAVULANATE POTASSIUM 875; 125 MG/1; MG/1
1 TABLET, FILM COATED ORAL 2 TIMES DAILY
Qty: 14 TABLET | Refills: 0 | Status: SHIPPED | OUTPATIENT
Start: 2021-11-17 | End: 2021-11-24

## 2021-11-17 RX ORDER — FLUTICASONE PROPIONATE 50 MCG
1 SPRAY, SUSPENSION (ML) NASAL DAILY
Qty: 1 EACH | Refills: 0 | Status: SHIPPED | OUTPATIENT
Start: 2021-11-17 | End: 2021-11-24 | Stop reason: ALTCHOICE

## 2021-11-17 NOTE — PATIENT INSTRUCTIONS
Try using the flonase in your nose as directed and warm compress to your face. May use Augmentin if not improving in 1-2 days. If any ear discharge follow up in clinic or worsening ear pain.                Middle Ear Infection (Adult)   You have an infectio All rights reserved. This information is not intended as a substitute for professional medical care. Always follow your healthcare professional's instructions. Augmentin Oral Tablet 875 mg  Uses  For treating bacterial infection.   Instructions  Take following is a list of some common side effects from this medicine. Please speak with your doctor about what you should do if you experience these or other side effects.   · diarrhea  · nausea  · stomach upset or abdominal pain  · vomiting  Call your doctor is for use in the nose. Avoid getting the medicine into the eyes or mouth. Rinse the eyes with water if the medicine is sprayed into the eyes. It is important that you keep taking each dose of this medicine on time even if you are feeling well.   If you fo doctor, nurse, or pharmacist if you have any questions about this medicine. [email or web address]  IMPORTANT NOTE: This document tells you briefly how to take your medicine, but it does not tell you all there is to know about it. Juan J Lopez

## 2021-11-17 NOTE — PROGRESS NOTES
CHIEF COMPLAINT:   Patient presents with:  Ear Pain: trouble hearing from left ear, slight pain x 1 dy       HPI:   Jhonatan Calderon is a 28year old female who presents to clinic today with complaints of left ear pain. Has had for 1  days.  Pain is described apparent distress  SKIN: no rashes,no suspicious lesions  HEAD: atraumatic, normocephalic  EYES: conjunctiva clear, EOM intact  EARS: Bilateral tragus non tender with manipulation. External auditory canals healthy and without lesions.  Right TM: gray, no b OTC medications and flonase, may take augmentin in 1-2 days or sooner if worsening. PT agreeable to this treatment plan.      Discussed s/s of worsening infection/condition with Patient and importance of prompt medical re-evaluation including when to seek e treatment. Home care  The following are general care guidelines:  · Finish all of the antibiotic medicine given, even though you may feel better after the first few days.   · You may use over-the-counter medicine, such as acetaminophen or ibuprofen, to c pharmacist about all the medicines you take. Include both prescription and over-the-counter medicines. Also tell them about any vitamins, herbal medicines, or anything else you take for your health.   If you have diabetes and use urine glucose tests, cody m reaction to this medicine. Symptoms can include difficulty breathing, skin rash, itching, swelling, or severe dizziness. If you notice any of these symptoms, seek medical help quickly.   Extra  Please speak with your doctor, nurse, or pharmacist if you have share this medicine with anyone who has not been prescribed this medicine. Cautions  Do not use the medication any more than instructed. Tell the doctor or pharmacist if you are pregnant, planning to be pregnant, or breastfeeding.   Ask your pharmacist if understand and is in agreement with treatment plan.

## 2021-11-18 ENCOUNTER — TELEMEDICINE (OUTPATIENT)
Dept: INTERNAL MEDICINE CLINIC | Facility: CLINIC | Age: 31
End: 2021-11-18

## 2021-11-18 DIAGNOSIS — E66.01 MORBID OBESITY WITH BMI OF 40.0-44.9, ADULT (HCC): ICD-10-CM

## 2021-11-18 DIAGNOSIS — Z51.81 ENCOUNTER FOR THERAPEUTIC DRUG MONITORING: Primary | ICD-10-CM

## 2021-11-18 DIAGNOSIS — F43.9 STRESS: ICD-10-CM

## 2021-11-18 PROCEDURE — 99213 OFFICE O/P EST LOW 20 MIN: CPT | Performed by: NURSE PRACTITIONER

## 2021-11-18 RX ORDER — NALTREXONE HYDROCHLORIDE AND BUPROPION HYDROCHLORIDE 8; 90 MG/1; MG/1
TABLET, EXTENDED RELEASE ORAL
Qty: 120 TABLET | Refills: 1 | Status: SHIPPED | OUTPATIENT
Start: 2021-11-18 | End: 2021-12-18

## 2021-11-18 NOTE — PROGRESS NOTES
[facility] Management follow up via video visit:    Subjective    This visit is conducted using Telemedicine with live, interactive video and audio.     Chief Complaint:  3 month follow up visit for lifestyle and medical management for overweigh for therapeutic drug monitoring  -     Naltrexone-buPROPion HCl ER (CONTRAVE) 8-90 MG Oral Tablet 12 Hr; Week 1: 1 tablet in AM, Week 2: 1 tablet twice daily, Week 3: 2 tablets in AM, 1 tablet in PM. Week 4: 2 tabs twice a day with meals.     Morbid obesity for willpower.  On the other hand, you’re frantically trying to make an excuse for eating with statements like:    “I deserve it!”  “It’s not healthy to deprive myself over the holidays.”  “Right now I don’t really care about calories.”  Sometimes you end u and you’ll probably eat less. Choose wisely. Take small amounts of “must have” foods and avoid any foods you’re not super excited about having. #2 Have a Strategy for Dealing with Family and Friends  Being around loved ones can soothe the soul.  However what’s on your plate without the added guilt.         Follow up visit: 2 months via clinic    Duration of the service: 20 minutes

## 2021-11-18 NOTE — PATIENT INSTRUCTIONS
Continue making lifestyle changes that focus on good nutrition, regular exercise and stress management. Medication Plan: Stop phentermine and switch to Contrave.  Start Contrave as directed and tolerated according to titration schedule:  Week 1- 1 tab da feel confident in your decision-making! I encourage you to try the strategies below for truly enjoying your holiday celebrations. When January gets here, you’ll be empowered to work at [employer].     Don’t Fret the Holidays – Stay in [address] eating well has helped you, others will find it harder to downplay what you are doing. Once at the event, try to stay under the radar. There’s no need to announce your “diet.” Others might not even notice what you are eating.  At the same time, try to be

## 2021-11-22 ENCOUNTER — LAB ENCOUNTER (OUTPATIENT)
Dept: LAB | Facility: HOSPITAL | Age: 31
End: 2021-11-22
Attending: NURSE PRACTITIONER
Payer: COMMERCIAL

## 2021-11-22 DIAGNOSIS — L65.9 HAIR LOSS: ICD-10-CM

## 2021-11-22 DIAGNOSIS — Z51.81 ENCOUNTER FOR THERAPEUTIC DRUG MONITORING: ICD-10-CM

## 2021-11-22 DIAGNOSIS — E66.01 MORBID OBESITY WITH BMI OF 40.0-44.9, ADULT (HCC): ICD-10-CM

## 2021-11-22 DIAGNOSIS — G43.909 MIGRAINE WITHOUT STATUS MIGRAINOSUS, NOT INTRACTABLE, UNSPECIFIED MIGRAINE TYPE: ICD-10-CM

## 2021-11-22 PROCEDURE — 84443 ASSAY THYROID STIM HORMONE: CPT

## 2021-11-22 PROCEDURE — 36415 COLL VENOUS BLD VENIPUNCTURE: CPT

## 2021-11-22 PROCEDURE — 80061 LIPID PANEL: CPT

## 2021-11-22 PROCEDURE — 85025 COMPLETE CBC W/AUTO DIFF WBC: CPT

## 2021-11-22 PROCEDURE — 82306 VITAMIN D 25 HYDROXY: CPT

## 2021-11-22 PROCEDURE — 84439 ASSAY OF FREE THYROXINE: CPT

## 2021-11-22 PROCEDURE — 80053 COMPREHEN METABOLIC PANEL: CPT

## 2021-11-22 PROCEDURE — 86038 ANTINUCLEAR ANTIBODIES: CPT

## 2021-11-22 PROCEDURE — 82607 VITAMIN B-12: CPT

## 2021-11-22 PROCEDURE — 83036 HEMOGLOBIN GLYCOSYLATED A1C: CPT

## 2021-11-23 NOTE — PATIENT INSTRUCTIONS
- You were seen in clinic for regular annual check-up. We reviewed the blood work with you in clinic today. -Thankfully we have ruled out any cardiac causes such as diabetes, thyroid disease for your symptoms.   With regards to your urinary frequency, and

## 2021-11-23 NOTE — H&P
Jj Uribe is a 28year old female. HPI:   Patient presents with:  Physical: New patient annual physical    Ms. Jaqueline Ortiz is a 28year old female coming in for new patient annual physical examination.     Was recently seen in urgent care for acute otitis m • ZACHARY  2014      Family History   Problem Relation Age of Onset   • Diabetes Mother    • Hypertension Mother    • Stroke Mother    • Other (Cancer, Lung) Paternal Grandfather       Social History: Social History    Tobacco Use      Smoking status: Never Weakness, Numbness, Paresthesias, Loss of Consciousness, Syncope, Dizziness, Headache, Falls  Psych:  Anxiety, Depression, Insomnia, Suicidal Ideation, Homicidal ideation, Memory Changes  Heme/Lymph: Bruising, Bleeding, Lymphadenopathy  Endocrine: Polyuria, RDW-SD 39.5 35.1 - 46.3 fL    RDW 11.7 11.0 - 15.0 %    .0 150.0 - 450.0 10(3)uL    Neutrophil Absolute Prelim 4.40 1.50 - 7.70 x10 (3) uL    Neutrophil Absolute 4.40 1.50 - 7.70 x10(3) uL    Lymphocyte Absolute 2.38 1.00 - 4.00 x10(3) uL    Mono 11/22/2021 66 (H)     LDL Cholesterol (mg/dL)   Date Value   11/22/2021 96     Triglycerides (mg/dL)   Date Value   11/22/2021 108       Last A1c value was 5.5% done 11/22/2021.     Vitamin D 15.8    Imaging:  CT abdomen and pelvis with contrast 1/30/2021 BIOPSY:      - LOW-GRADE SQUAMOUS INTRAEPITHELIAL LESION (DARIAN 1). 3.  CERVIX, 10 O'CLOCK, BIOPSY:      - LOW-GRADE SQUAMOUS INTRAEPITHELIAL LESION (DARIAN 1). 4.  ENDOCERVIX, CURETTAGE:      - ENDOCERVICAL TISSUE WITHOUT DIAGNOSTIC ABNORMALITY. tissue, firm and rubbery in consistency.   –Bilateral limited ultrasound of the breasts       Orders This Visit:  Orders Placed This Encounter      Urinalysis with Culture Reflex      WILDER, Direct Screen [E]      Osmolality, Urine [E]      Creatinine, Urine,

## 2021-11-24 ENCOUNTER — OFFICE VISIT (OUTPATIENT)
Dept: INTERNAL MEDICINE CLINIC | Facility: CLINIC | Age: 31
End: 2021-11-24
Payer: COMMERCIAL

## 2021-11-24 VITALS
SYSTOLIC BLOOD PRESSURE: 128 MMHG | TEMPERATURE: 98 F | HEIGHT: 66 IN | OXYGEN SATURATION: 98 % | WEIGHT: 241 LBS | DIASTOLIC BLOOD PRESSURE: 74 MMHG | BODY MASS INDEX: 38.73 KG/M2 | HEART RATE: 71 BPM

## 2021-11-24 DIAGNOSIS — R10.2 PELVIC PAIN: ICD-10-CM

## 2021-11-24 DIAGNOSIS — E55.9 VITAMIN D DEFICIENCY: ICD-10-CM

## 2021-11-24 DIAGNOSIS — N64.4 BREAST TENDERNESS: ICD-10-CM

## 2021-11-24 DIAGNOSIS — R87.610 ASCUS OF CERVIX WITH NEGATIVE HIGH RISK HPV: Primary | ICD-10-CM

## 2021-11-24 DIAGNOSIS — L65.9 HAIR LOSS: ICD-10-CM

## 2021-11-24 DIAGNOSIS — N63.0 BREAST NODULE: ICD-10-CM

## 2021-11-24 DIAGNOSIS — F17.200 SMOKER: ICD-10-CM

## 2021-11-24 DIAGNOSIS — Z98.890 HISTORY OF LOOP ELECTRICAL EXCISION PROCEDURE (LEEP): ICD-10-CM

## 2021-11-24 DIAGNOSIS — R35.89 POLYURIA: ICD-10-CM

## 2021-11-24 PROCEDURE — 3008F BODY MASS INDEX DOCD: CPT | Performed by: INTERNAL MEDICINE

## 2021-11-24 PROCEDURE — 3078F DIAST BP <80 MM HG: CPT | Performed by: INTERNAL MEDICINE

## 2021-11-24 PROCEDURE — 99385 PREV VISIT NEW AGE 18-39: CPT | Performed by: INTERNAL MEDICINE

## 2021-11-24 PROCEDURE — 3074F SYST BP LT 130 MM HG: CPT | Performed by: INTERNAL MEDICINE

## 2021-11-24 RX ORDER — ERGOCALCIFEROL 1.25 MG/1
50000 CAPSULE ORAL WEEKLY
Qty: 8 CAPSULE | Refills: 0 | Status: SHIPPED | OUTPATIENT
Start: 2021-11-24 | End: 2021-12-24

## 2021-11-24 RX ORDER — OXYBUTYNIN CHLORIDE 5 MG/1
5 TABLET ORAL 2 TIMES DAILY
Qty: 180 TABLET | Refills: 3 | Status: SHIPPED | OUTPATIENT
Start: 2021-11-24

## 2021-11-26 ENCOUNTER — TELEPHONE (OUTPATIENT)
Dept: INTERNAL MEDICINE CLINIC | Facility: CLINIC | Age: 31
End: 2021-11-26

## 2021-11-26 NOTE — TELEPHONE ENCOUNTER
LMTCB w/patient. Privacy does not allow detailed messages to be left via phone. The following \[facility]\"" & [facility] sent to patient: To: Harriet Tomas      From: Justin Hardy CMA      Created: 11/26/2021 9:25 AM      Jon Mckay reaching you via phone.

## 2021-11-26 NOTE — TELEPHONE ENCOUNTER
Please notify the patient that her autoimmune screen and the blood that was added was negative. And her urinalysis was negative for UTI.     No new recommendations, lets try the medication for urination for at least 2 to 3 weeks, give us an update around t

## 2021-12-17 ENCOUNTER — TELEPHONE (OUTPATIENT)
Dept: INTERNAL MEDICINE CLINIC | Facility: CLINIC | Age: 31
End: 2021-12-17

## 2021-12-17 DIAGNOSIS — Z12.31 ENCOUNTER FOR SCREENING MAMMOGRAM FOR MALIGNANT NEOPLASM OF BREAST: Primary | ICD-10-CM

## 2021-12-17 NOTE — TELEPHONE ENCOUNTER
Please call Daphnie/[facility]/Radiology  Requesting order for diagnostic bi-lateral mammogram with ultra sound to follow  This is protocol for patients over 30   Tasked to nursing Problem: Respiratory Impairment - Respiratory Therapy 253  Intervention: Inhaled medication delivery  Intervention Status  Not Done  Intervention: Inhaled gas administration  Intervention Status  Not Done  Intervention: Respiratory support devices  Intervention Status  Not Done

## 2021-12-30 ENCOUNTER — HOSPITAL ENCOUNTER (OUTPATIENT)
Dept: MAMMOGRAPHY | Facility: HOSPITAL | Age: 31
Discharge: HOME | End: 2021-12-30
Attending: INTERNAL MEDICINE
Payer: COMMERCIAL

## 2021-12-30 DIAGNOSIS — Z12.31 ENCOUNTER FOR SCREENING MAMMOGRAM FOR MALIGNANT NEOPLASM OF BREAST: ICD-10-CM

## 2021-12-30 PROCEDURE — 77062 BREAST TOMOSYNTHESIS BI: CPT | Performed by: INTERNAL MEDICINE

## 2021-12-30 PROCEDURE — 77066 DX MAMMO INCL CAD BI: CPT | Performed by: INTERNAL MEDICINE

## 2022-01-03 ENCOUNTER — TELEPHONE (OUTPATIENT)
Dept: OBGYN CLINIC | Facility: CLINIC | Age: 32
End: 2022-01-03

## 2022-01-05 ENCOUNTER — TELEPHONE (OUTPATIENT)
Dept: INTERNAL MEDICINE CLINIC | Facility: CLINIC | Age: 32
End: 2022-01-05

## 2022-01-05 NOTE — TELEPHONE ENCOUNTER
I reviewed the mammogram 12/30/2021: No mammographic evidence of malignancy in either breast. We will follow clinically. LVM with above.  Can make an appointment if needed in the next few weeks for follow-up

## 2022-01-09 ENCOUNTER — TELEPHONE (OUTPATIENT)
Dept: INTERNAL MEDICINE CLINIC | Facility: HOSPITAL | Age: 32
End: 2022-01-09

## 2022-01-09 NOTE — TELEPHONE ENCOUNTER
Outside Covid Testing done    Results and RTW guidelines:    COVID RESULT reported:      Test type:    [x] Rapid outside         [] PCR outside       [] Home Test    Date of test: 1/4/22     Test location: [place] outside facility         [] Result viewed Still has a fever, vomiting or diarrhea   - Keep communication open with management about RTW and if symptoms worsen     Notes:     RTW PLAN:    [x]  If COVID positive results, off work minimum of 5 days from positive test or onset of symptoms (day 0)    [ vaccinated      Which vaccine:  Pfizer []     Milagros Wang [x]    J&J []          Date of symptom onset: 1/4/22    SYMPTOMS:  [] asymptomatic    • Fever > 100F               Yes []          • Cough                          Yes [x]      • Shortness of breath  Y mostly resolved-positive rapid and PCR viewed.  Will return to work 1/10/22

## 2022-02-17 ENCOUNTER — TELEMEDICINE (OUTPATIENT)
Dept: INTERNAL MEDICINE CLINIC | Facility: CLINIC | Age: 32
End: 2022-02-17

## 2022-02-17 DIAGNOSIS — E66.01 MORBID OBESITY WITH BMI OF 40.0-44.9, ADULT (HCC): ICD-10-CM

## 2022-02-17 DIAGNOSIS — Z12.31 ENCOUNTER FOR SCREENING MAMMOGRAM FOR MALIGNANT NEOPLASM OF BREAST: Primary | ICD-10-CM

## 2022-02-17 DIAGNOSIS — R63.2 BINGE EATING: ICD-10-CM

## 2022-02-17 PROCEDURE — 99213 OFFICE O/P EST LOW 20 MIN: CPT | Performed by: NURSE PRACTITIONER

## 2022-02-17 RX ORDER — SEMAGLUTIDE 0.25 MG/.5ML
0.25 INJECTION, SOLUTION SUBCUTANEOUS WEEKLY
Qty: 2 ML | Refills: 0 | Status: SHIPPED | OUTPATIENT
Start: 2022-02-17 | End: 2022-03-11

## 2022-03-16 ENCOUNTER — TELEPHONE (OUTPATIENT)
Dept: INTERNAL MEDICINE CLINIC | Facility: CLINIC | Age: 32
End: 2022-03-16

## 2022-03-16 ENCOUNTER — OFFICE VISIT (OUTPATIENT)
Dept: OBGYN CLINIC | Facility: CLINIC | Age: 32
End: 2022-03-16
Payer: COMMERCIAL

## 2022-03-16 ENCOUNTER — TELEPHONE (OUTPATIENT)
Dept: OBGYN CLINIC | Facility: CLINIC | Age: 32
End: 2022-03-16

## 2022-03-16 VITALS
HEART RATE: 82 BPM | BODY MASS INDEX: 41.3 KG/M2 | WEIGHT: 257 LBS | HEIGHT: 66 IN | SYSTOLIC BLOOD PRESSURE: 131 MMHG | DIASTOLIC BLOOD PRESSURE: 84 MMHG

## 2022-03-16 DIAGNOSIS — Z12.4 SCREENING FOR MALIGNANT NEOPLASM OF CERVIX: ICD-10-CM

## 2022-03-16 DIAGNOSIS — N94.6 DYSMENORRHEA: Primary | ICD-10-CM

## 2022-03-16 PROBLEM — N83.202 LEFT OVARIAN CYST: Status: ACTIVE | Noted: 2017-07-11

## 2022-03-16 PROBLEM — F41.9 ANXIETY: Status: ACTIVE | Noted: 2021-09-05

## 2022-03-16 PROCEDURE — 99213 OFFICE O/P EST LOW 20 MIN: CPT | Performed by: ADVANCED PRACTICE MIDWIFE

## 2022-03-16 PROCEDURE — 3079F DIAST BP 80-89 MM HG: CPT | Performed by: ADVANCED PRACTICE MIDWIFE

## 2022-03-16 PROCEDURE — 3075F SYST BP GE 130 - 139MM HG: CPT | Performed by: ADVANCED PRACTICE MIDWIFE

## 2022-03-16 PROCEDURE — 3008F BODY MASS INDEX DOCD: CPT | Performed by: ADVANCED PRACTICE MIDWIFE

## 2022-03-16 NOTE — TELEPHONE ENCOUNTER
Started yesterday with heavy bleeding that started that yesterday. Pain for heavy bleeding. Had to change for pads. Again advised emergency department evaluation, she is amenable. Discussed that we do not want this to progress further and she may need more expedited work-up. She is agreeable to go now.     Nursing to follow-up tomorrow

## 2022-03-16 NOTE — TELEPHONE ENCOUNTER
I spoke with patient. She explained that she had her period 2 weeks ago. She is bleeding a lot and more than usual. This is not her regular cycle and there is more bleeding than with her normal period. She has pain. She is wearing a pad that is like a diaper. She does not think she is pregnant. She explained that she left work and she is driving now to [employer]. I recommended ER evaluation today and she declines. She says she cannot afford an ER visit. She is very tight on money. She asks for medication to stop the bleeding. I explained that she will need evaluation for her current symptoms. She says this is the second day she is bleeding. She feels drained and tired. She denied any dizziness or lightheadedness. She denied a sensation of rapid heart rate. She denied any changes in vision. She feels nervous. She is nervous she just had to leave work. She had to change her pads four times yesterday. She changed her pad three times today. She does not see clots. She is having dark blood not red. Her pain is like abdominal cramping and it feels draining to her. She has decreased appetite and she has not eaten today. She is drinking a lot of water. Explained that she will need evaluation today. I did ask her if she sees OBGYN and she cannot recall who she saw last. She says she saw a midwife, per [facility], she was last seen with Midwife Lissteh Contreras. I provided her with the phone number for the midwifery clinic. She will call her now to get their recommendations. Explained that I would relay her symptoms to Dr. Prasanna Hanson and call her right back with a recommendation.

## 2022-03-16 NOTE — TELEPHONE ENCOUNTER
Pt was told to call by her pcp. Pt started her menstrual cycle yesterday & reports heavy bleeding. Changed pad 4 times yesterday & 3 times today, so far. Is soaking thick pad w/ dark red blood. No clots. Is asymptomatic but sounds breathy on phone. Offered appt tomorrow w/ Yannick Gagnon or today w/ KHARI. Pt desires appt asap & appt scheduled.  Pt verbalized an understanding & agrees w/ plan

## 2022-03-17 LAB — HPV I/H RISK 1 DNA SPEC QL NAA+PROBE: NEGATIVE

## 2022-03-17 RX ORDER — SEMAGLUTIDE 0.25 MG/.5ML
INJECTION, SOLUTION SUBCUTANEOUS
Qty: 2 ML | Refills: 0 | OUTPATIENT
Start: 2022-03-17

## 2022-03-17 NOTE — TELEPHONE ENCOUNTER
Requesting wegovy  LOV: 2/17/22  RTC: 6 weeks  Last Relevant Labs: na  Filled: 2/17/22 #2ml with 0 refills    Patient called to apologize that she missed her appt today. She was at gyne appointment yesterday for bleeding disorder and slept late and missed her visit. I told her she must reschedule and she will. She just finished wegovy 0.25 mg and lost 5 pounds. She is very excited to continue and asks for the next dose up if you will allow. I have pended it.

## 2022-03-18 RX ORDER — SEMAGLUTIDE 0.5 MG/.5ML
0.5 INJECTION, SOLUTION SUBCUTANEOUS WEEKLY
Qty: 2 ML | Refills: 0 | Status: SHIPPED | OUTPATIENT
Start: 2022-03-18 | End: 2022-04-09

## 2022-03-21 ENCOUNTER — TELEPHONE (OUTPATIENT)
Dept: OBGYN CLINIC | Facility: CLINIC | Age: 32
End: 2022-03-21

## 2022-03-21 NOTE — TELEPHONE ENCOUNTER
Pt states she had sexual intercourse with her ex last Friday and he told her he has herpes. Pt states she has noticed a bump and would like to have STD screening. Pt has appt scheduled on 3/24 with Leyda Zafar. Pt advised to keep appt and discuss further with Leyda Zafar. Pt agreed and voiced understanding.

## 2022-03-22 ENCOUNTER — TELEPHONE (OUTPATIENT)
Dept: OBGYN CLINIC | Facility: CLINIC | Age: 32
End: 2022-03-22

## 2022-03-24 ENCOUNTER — OFFICE VISIT (OUTPATIENT)
Dept: OBGYN CLINIC | Facility: CLINIC | Age: 32
End: 2022-03-24
Payer: COMMERCIAL

## 2022-03-24 ENCOUNTER — LAB ENCOUNTER (OUTPATIENT)
Dept: LAB | Facility: HOSPITAL | Age: 32
End: 2022-03-24
Attending: ADVANCED PRACTICE MIDWIFE
Payer: COMMERCIAL

## 2022-03-24 VITALS
BODY MASS INDEX: 41 KG/M2 | SYSTOLIC BLOOD PRESSURE: 107 MMHG | HEART RATE: 85 BPM | WEIGHT: 257 LBS | DIASTOLIC BLOOD PRESSURE: 75 MMHG

## 2022-03-24 DIAGNOSIS — Z11.3 ROUTINE SCREENING FOR STI (SEXUALLY TRANSMITTED INFECTION): ICD-10-CM

## 2022-03-24 DIAGNOSIS — T74.21XA SEXUAL ASSAULT OF ADULT, INITIAL ENCOUNTER: Primary | ICD-10-CM

## 2022-03-24 DIAGNOSIS — R39.9 UTI SYMPTOMS: ICD-10-CM

## 2022-03-24 DIAGNOSIS — L73.9 FOLLICULITIS: ICD-10-CM

## 2022-03-24 DIAGNOSIS — N89.8 VAGINAL DISCHARGE: ICD-10-CM

## 2022-03-24 LAB
APPEARANCE: CLEAR
BILIRUBIN: NEGATIVE
GLUCOSE (URINE DIPSTICK): NEGATIVE MG/DL
HCV AB SERPL QL IA: NONREACTIVE
KETONES (URINE DIPSTICK): NEGATIVE MG/DL
LEUKOCYTES: NEGATIVE
MULTISTIX LOT#: NORMAL NUMERIC
NITRITE, URINE: NEGATIVE
OCCULT BLOOD: NEGATIVE
PH, URINE: 5 (ref 4.5–8)
PROTEIN (URINE DIPSTICK): NEGATIVE MG/DL
SPECIFIC GRAVITY: 1.03 (ref 1–1.03)

## 2022-03-24 PROCEDURE — 3078F DIAST BP <80 MM HG: CPT | Performed by: ADVANCED PRACTICE MIDWIFE

## 2022-03-24 PROCEDURE — 99214 OFFICE O/P EST MOD 30 MIN: CPT | Performed by: ADVANCED PRACTICE MIDWIFE

## 2022-03-24 PROCEDURE — 87389 HIV-1 AG W/HIV-1&-2 AB AG IA: CPT

## 2022-03-24 PROCEDURE — 36415 COLL VENOUS BLD VENIPUNCTURE: CPT

## 2022-03-24 PROCEDURE — 86780 TREPONEMA PALLIDUM: CPT

## 2022-03-24 PROCEDURE — 81002 URINALYSIS NONAUTO W/O SCOPE: CPT | Performed by: ADVANCED PRACTICE MIDWIFE

## 2022-03-24 PROCEDURE — 3074F SYST BP LT 130 MM HG: CPT | Performed by: ADVANCED PRACTICE MIDWIFE

## 2022-03-24 PROCEDURE — 86803 HEPATITIS C AB TEST: CPT
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
## 2022-03-25 ENCOUNTER — TELEPHONE (OUTPATIENT)
Dept: OBGYN CLINIC | Facility: CLINIC | Age: 32
End: 2022-03-25

## 2022-03-25 LAB
C TRACH DNA SPEC QL NAA+PROBE: NEGATIVE
N GONORRHOEA DNA SPEC QL NAA+PROBE: NEGATIVE
T PALLIDUM AB SER QL: NEGATIVE

## 2022-03-25 NOTE — TELEPHONE ENCOUNTER
Pt would like to discuss test results, she is having anxiety over it. Pt came on 3/24.     Please advise

## 2022-03-25 NOTE — TELEPHONE ENCOUNTER
Spoke to patient, is really anxious about test results. informed patient labs results as of right now are all negative including the HIV. Patient advised we are still waiting on the results of her vaginal cx but trich and bv have come back negative and are waiting on the results for the yeast. Patient asked if there was any results on hsv. Patient advised no culture was collected for hsv since she did not have open lesion to cx and chas indicated it looked more like folliculitis. Per Hope Chinchilla. Patient is to repeat HIV testing in 6 months. Patient is not notify the office if she develops pelvic pain, fever or if she has any changes to the lesion. Further follow up will be addressed during her follow up visit on 4/14.  Patient agrees with plan and verbally understands

## 2022-03-26 LAB
GENITAL VAGINOSIS SCREEN: NEGATIVE
TRICHOMONAS SCREEN: NEGATIVE

## 2022-04-18 ENCOUNTER — OFFICE VISIT (OUTPATIENT)
Dept: OBGYN CLINIC | Facility: CLINIC | Age: 32
End: 2022-04-18
Payer: COMMERCIAL

## 2022-04-18 ENCOUNTER — TELEPHONE (OUTPATIENT)
Dept: INTERNAL MEDICINE CLINIC | Facility: CLINIC | Age: 32
End: 2022-04-18

## 2022-04-18 VITALS — WEIGHT: 262.19 LBS | DIASTOLIC BLOOD PRESSURE: 80 MMHG | SYSTOLIC BLOOD PRESSURE: 122 MMHG | BODY MASS INDEX: 42 KG/M2

## 2022-04-18 DIAGNOSIS — Z11.3 SCREEN FOR STD (SEXUALLY TRANSMITTED DISEASE): ICD-10-CM

## 2022-04-18 DIAGNOSIS — Z12.4 SCREENING FOR MALIGNANT NEOPLASM OF CERVIX: Primary | ICD-10-CM

## 2022-04-18 PROBLEM — Z87.42 HISTORY OF ABNORMAL CERVICAL PAP SMEAR: Status: RESOLVED | Noted: 2021-06-08 | Resolved: 2022-04-18

## 2022-04-18 PROBLEM — Z87.42 HISTORY OF ABNORMAL CERVICAL PAP SMEAR: Status: ACTIVE | Noted: 2021-06-08

## 2022-04-18 PROBLEM — Z87.42 HISTORY OF ABNORMAL CERVICAL PAP SMEAR: Status: ACTIVE | Noted: 2022-04-18

## 2022-04-18 PROCEDURE — 87624 HPV HI-RISK TYP POOLED RSLT: CPT | Performed by: ADVANCED PRACTICE MIDWIFE

## 2022-04-18 PROCEDURE — 87491 CHLMYD TRACH DNA AMP PROBE: CPT | Performed by: ADVANCED PRACTICE MIDWIFE

## 2022-04-18 PROCEDURE — 87591 N.GONORRHOEAE DNA AMP PROB: CPT | Performed by: ADVANCED PRACTICE MIDWIFE

## 2022-04-18 RX ORDER — SEMAGLUTIDE 0.5 MG/.5ML
INJECTION, SOLUTION SUBCUTANEOUS
Qty: 2 ML | Refills: 0 | Status: CANCELLED | OUTPATIENT
Start: 2022-04-18

## 2022-04-18 RX ORDER — SEMAGLUTIDE 1 MG/.5ML
1 INJECTION, SOLUTION SUBCUTANEOUS WEEKLY
Qty: 2 ML | Refills: 0 | Status: SHIPPED | OUTPATIENT
Start: 2022-04-18

## 2022-04-18 NOTE — TELEPHONE ENCOUNTER
This was already routed to [facility] today.

## 2022-04-18 NOTE — TELEPHONE ENCOUNTER
Patient did not respond to my chart and has been calling the front. I called her to discuss all and she did not answer. Left message she must reschedule and I must verify what she is trying to refill.

## 2022-04-18 NOTE — TELEPHONE ENCOUNTER
Pt was late to her appt due to construction rerouting. Pt was not able to be seen today. Pt is out of her refills . Pt is requesting a refills.  Pt is Scheduled  for in ofc visit 5/23

## 2022-04-18 NOTE — TELEPHONE ENCOUNTER
Requesting WEgovy increase dose  LOV: 2/17/22  RTC: 2 months  Last Relevant Labs: na  Filled: 3/18/22 #2ml with 0 refills wegovy 0.5 mg    5/23/2022  1:40 PM DEB Murphy EMGWEI [id number]     Pt missed her appt today d/t construction detours. She is sorry and asking to get a refill as she did reschedule.   She is ready to move up dose if you will allow

## 2022-04-19 LAB
C TRACH DNA SPEC QL NAA+PROBE: NEGATIVE
N GONORRHOEA DNA SPEC QL NAA+PROBE: NEGATIVE

## 2022-04-20 ENCOUNTER — LAB ENCOUNTER (OUTPATIENT)
Dept: LAB | Facility: HOSPITAL | Age: 32
End: 2022-04-20
Attending: ADVANCED PRACTICE MIDWIFE
Payer: COMMERCIAL

## 2022-04-20 DIAGNOSIS — Z11.3 SCREEN FOR STD (SEXUALLY TRANSMITTED DISEASE): ICD-10-CM

## 2022-04-20 LAB
HBV SURFACE AG SER-ACNC: 0.26 [IU]/L
HBV SURFACE AG SERPL QL IA: NONREACTIVE
HCV AB SERPL QL IA: NONREACTIVE
HPV I/H RISK 1 DNA SPEC QL NAA+PROBE: POSITIVE

## 2022-04-20 PROCEDURE — 87340 HEPATITIS B SURFACE AG IA: CPT

## 2022-04-20 PROCEDURE — 86803 HEPATITIS C AB TEST: CPT

## 2022-04-20 PROCEDURE — 36415 COLL VENOUS BLD VENIPUNCTURE: CPT

## 2022-04-20 PROCEDURE — 86780 TREPONEMA PALLIDUM: CPT

## 2022-04-20 PROCEDURE — 87389 HIV-1 AG W/HIV-1&-2 AB AG IA: CPT

## 2022-04-21 RX ORDER — SEMAGLUTIDE 0.5 MG/.5ML
INJECTION, SOLUTION SUBCUTANEOUS
Qty: 2 ML | Refills: 0 | OUTPATIENT
Start: 2022-04-21

## 2022-04-22 ENCOUNTER — TELEPHONE (OUTPATIENT)
Dept: OBGYN CLINIC | Facility: CLINIC | Age: 32
End: 2022-04-22

## 2022-04-22 LAB — T PALLIDUM AB SER QL: NEGATIVE

## 2022-04-22 NOTE — TELEPHONE ENCOUNTER
----- Message from Keri Saint, CNM sent at 4/22/2022 11:44 AM CDT -----  Please call patient and let her know HIV, Hep B and Hep C were negative. Still waiting on Trep and pap results. Thanks!

## 2022-05-04 ENCOUNTER — TELEPHONE (OUTPATIENT)
Dept: OBGYN CLINIC | Facility: CLINIC | Age: 32
End: 2022-05-04

## 2022-05-04 NOTE — TELEPHONE ENCOUNTER
Phone call to patient to review pap results and answer patient questions. Informed her that cytology was normal but pap was positive for HPV. Previous paps were negative for HPV. Patient is concerned as to why it is HPV positive when she got the HPV vaccine. Informed her that the vaccine does not protect against all strains of the virus but does provide protection against strains that are most likely to lead to cervical cancer. She inquired whether she should repeat vaccine series. Informed her that is not recommended. Discussed recommendation for repeat pap in 1 year but that she could have a repeat in 6 months if she is concerned and desires that. Answered all patient questions.

## 2022-05-18 ENCOUNTER — PATIENT MESSAGE (OUTPATIENT)
Dept: INTERNAL MEDICINE CLINIC | Facility: CLINIC | Age: 32
End: 2022-05-18

## 2022-05-19 RX ORDER — SEMAGLUTIDE 1.7 MG/.75ML
1.7 INJECTION, SOLUTION SUBCUTANEOUS WEEKLY
Qty: 3 ML | Refills: 0 | Status: SHIPPED | OUTPATIENT
Start: 2022-05-19 | End: 2022-05-23 | Stop reason: DRUGHIGH

## 2022-05-19 NOTE — TELEPHONE ENCOUNTER
Future Appointments   Date Time Provider Ila Bess   5/23/2022  1:40 PM DEB [facility] [id number]     This appointment was made 4/18/22  Do you need to see her first before allowing wegovy dose increase?     Requesting wegovy increase  LOV: 2/17/22  RTC: 6 weeks  Last Relevant Labs: na  Filled: 4/18/22 #2ml with 0 refills WEgovy 1 mg    Future Appointments   Date Time Provider Ila Bess   5/23/2022  1:40 PM DEB Israel EMGWEI [id number]

## 2022-05-20 ENCOUNTER — TELEPHONE (OUTPATIENT)
Dept: OBGYN CLINIC | Facility: CLINIC | Age: 32
End: 2022-05-20

## 2022-05-20 NOTE — TELEPHONE ENCOUNTER
Pt needs office notes from 3/16 to show her employer that she saw a provider. Advised pt she should be able to see all office notes & info in [address]  Pt verbalized an understanding & agrees w/ plan

## 2022-05-23 ENCOUNTER — TELEPHONE (OUTPATIENT)
Dept: OBGYN CLINIC | Facility: CLINIC | Age: 32
End: 2022-05-23

## 2022-05-23 ENCOUNTER — OFFICE VISIT (OUTPATIENT)
Dept: INTERNAL MEDICINE CLINIC | Facility: CLINIC | Age: 32
End: 2022-05-23
Payer: COMMERCIAL

## 2022-05-23 ENCOUNTER — TELEPHONE (OUTPATIENT)
Dept: INTERNAL MEDICINE CLINIC | Facility: CLINIC | Age: 32
End: 2022-05-23

## 2022-05-23 VITALS
RESPIRATION RATE: 16 BRPM | HEART RATE: 80 BPM | BODY MASS INDEX: 39.86 KG/M2 | SYSTOLIC BLOOD PRESSURE: 110 MMHG | DIASTOLIC BLOOD PRESSURE: 76 MMHG | WEIGHT: 248 LBS | HEIGHT: 66 IN

## 2022-05-23 DIAGNOSIS — L65.9 HAIR THINNING: ICD-10-CM

## 2022-05-23 DIAGNOSIS — E66.01 MORBID OBESITY WITH BMI OF 40.0-44.9, ADULT (HCC): ICD-10-CM

## 2022-05-23 DIAGNOSIS — E53.8 LOW VITAMIN B12 LEVEL: ICD-10-CM

## 2022-05-23 DIAGNOSIS — E55.9 VITAMIN D DEFICIENCY: ICD-10-CM

## 2022-05-23 DIAGNOSIS — Z51.81 ENCOUNTER FOR THERAPEUTIC DRUG MONITORING: Primary | ICD-10-CM

## 2022-05-23 PROCEDURE — 3078F DIAST BP <80 MM HG: CPT | Performed by: NURSE PRACTITIONER

## 2022-05-23 PROCEDURE — 99214 OFFICE O/P EST MOD 30 MIN: CPT | Performed by: NURSE PRACTITIONER

## 2022-05-23 PROCEDURE — 3074F SYST BP LT 130 MM HG: CPT | Performed by: NURSE PRACTITIONER

## 2022-05-23 PROCEDURE — 3008F BODY MASS INDEX DOCD: CPT | Performed by: NURSE PRACTITIONER

## 2022-05-23 RX ORDER — SEMAGLUTIDE 2.4 MG/.75ML
2.4 INJECTION, SOLUTION SUBCUTANEOUS WEEKLY
Qty: 3 ML | Refills: 2 | Status: SHIPPED | OUTPATIENT
Start: 2022-05-23

## 2022-05-23 NOTE — TELEPHONE ENCOUNTER
RN spoke with DR. PORTER who instructed her to call patient and nadya callejas keep brina and be seen in the office- called patient and she verbalized understanding

## 2022-05-23 NOTE — TELEPHONE ENCOUNTER
Pt scheduled mychart appt for 5/24/22 for the following:    \"My throat:    Fischer Nephew for patient to be seen in the office?     Tasked to nursing

## 2022-05-23 NOTE — TELEPHONE ENCOUNTER
To FEDE - called patient who was diagnosed that she has HPV - past month she is spitting some clear stuff , weird taste in the mouth - she is wandering if she has HPV in throat - it is swollen inside throat   Scheduled appointment scheduled for tomorrow  ok to keep appointment ?   Has it scheduled for 11 am - JENI is not working

## 2022-05-24 ENCOUNTER — OFFICE VISIT (OUTPATIENT)
Dept: INTERNAL MEDICINE CLINIC | Facility: CLINIC | Age: 32
End: 2022-05-24
Payer: COMMERCIAL

## 2022-05-24 VITALS
BODY MASS INDEX: 40 KG/M2 | DIASTOLIC BLOOD PRESSURE: 82 MMHG | HEIGHT: 66 IN | SYSTOLIC BLOOD PRESSURE: 118 MMHG | TEMPERATURE: 98 F | OXYGEN SATURATION: 100 % | HEART RATE: 83 BPM

## 2022-05-24 DIAGNOSIS — J02.9 SORE THROAT: Primary | ICD-10-CM

## 2022-05-24 DIAGNOSIS — B97.7 HPV IN FEMALE: ICD-10-CM

## 2022-05-24 DIAGNOSIS — E55.9 VITAMIN D DEFICIENCY: ICD-10-CM

## 2022-05-24 PROCEDURE — 3074F SYST BP LT 130 MM HG: CPT | Performed by: INTERNAL MEDICINE

## 2022-05-24 PROCEDURE — 3008F BODY MASS INDEX DOCD: CPT | Performed by: INTERNAL MEDICINE

## 2022-05-24 PROCEDURE — 3079F DIAST BP 80-89 MM HG: CPT | Performed by: INTERNAL MEDICINE

## 2022-05-24 PROCEDURE — 99215 OFFICE O/P EST HI 40 MIN: CPT | Performed by: INTERNAL MEDICINE

## 2022-05-25 ENCOUNTER — APPOINTMENT (OUTPATIENT)
Dept: CT IMAGING | Age: 32
End: 2022-05-25
Payer: COMMERCIAL

## 2022-05-25 ENCOUNTER — HOSPITAL ENCOUNTER (OUTPATIENT)
Age: 32
Discharge: HOME | End: 2022-05-25
Payer: COMMERCIAL

## 2022-05-25 VITALS
TEMPERATURE: 98 F | DIASTOLIC BLOOD PRESSURE: 79 MMHG | SYSTOLIC BLOOD PRESSURE: 125 MMHG | RESPIRATION RATE: 18 BRPM | HEART RATE: 86 BPM | OXYGEN SATURATION: 99 %

## 2022-05-25 DIAGNOSIS — T14.8XXA MUSCLE STRAIN: Primary | ICD-10-CM

## 2022-05-25 PROCEDURE — 70450 CT HEAD/BRAIN W/O DYE: CPT

## 2022-05-25 PROCEDURE — 99214 OFFICE O/P EST MOD 30 MIN: CPT

## 2022-05-25 PROCEDURE — 72125 CT NECK SPINE W/O DYE: CPT

## 2022-05-25 PROCEDURE — 99204 OFFICE O/P NEW MOD 45 MIN: CPT

## 2022-05-25 RX ORDER — CYCLOBENZAPRINE HCL 10 MG
10 TABLET ORAL 3 TIMES DAILY PRN
Qty: 20 TABLET | Refills: 0 | Status: SHIPPED | OUTPATIENT
Start: 2022-05-25 | End: 2022-06-01
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
## 2022-05-27 ENCOUNTER — TELEPHONE (OUTPATIENT)
Dept: INTERNAL MEDICINE CLINIC | Facility: CLINIC | Age: 32
End: 2022-05-27

## 2022-05-27 NOTE — TELEPHONE ENCOUNTER
I spoke to Sue and relayed Dr Placido Castano message to her. She verbalized understanding. She says she will come  the letter now. Letter printed and signed by Dr Loyda Kraft. Letter up at  for . I also checked with Dr Loyda Kraft to determine what appt slot he would like to use for Tuesday.  Appt made for Tuesday at 5pm, hour long appt per Dr Edna Amaya request.
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
## 2022-05-27 NOTE — TELEPHONE ENCOUNTER
Patient should not work under her physical symptoms right now. If these are intolerable or unmanageable at home, should be seen in the urgent care at the very least versus emergency department. She should call her supervisor or employee health. I provided her with a work letter, but we cannot clear her to return to work without a clinical evaluation. Earliest I can see her is on Tuesday. She should reduce the use of Excedrin. Favor the use of Tylenol 500- 650 mg on an as needed basis. Resting, icing to affected areas. Letter sent via [employer].  She should let us know what other documents she may need for excused absence

## 2022-05-31 ENCOUNTER — OFFICE VISIT (OUTPATIENT)
Dept: INTERNAL MEDICINE CLINIC | Facility: CLINIC | Age: 32
End: 2022-05-31
Payer: COMMERCIAL

## 2022-05-31 VITALS
DIASTOLIC BLOOD PRESSURE: 72 MMHG | WEIGHT: 250 LBS | HEIGHT: 66 IN | SYSTOLIC BLOOD PRESSURE: 117 MMHG | RESPIRATION RATE: 16 BRPM | BODY MASS INDEX: 40.18 KG/M2 | HEART RATE: 81 BPM

## 2022-05-31 DIAGNOSIS — F32.A ANXIETY AND DEPRESSION: ICD-10-CM

## 2022-05-31 DIAGNOSIS — F41.9 ANXIETY AND DEPRESSION: ICD-10-CM
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
## 2022-05-31 PROCEDURE — 3074F SYST BP LT 130 MM HG: CPT | Performed by: INTERNAL MEDICINE

## 2022-05-31 PROCEDURE — 99215 OFFICE O/P EST HI 40 MIN: CPT | Performed by: INTERNAL MEDICINE

## 2022-05-31 PROCEDURE — 3008F BODY MASS INDEX DOCD: CPT | Performed by: INTERNAL MEDICINE

## 2022-05-31 PROCEDURE — 3078F DIAST BP <80 MM HG: CPT | Performed by: INTERNAL MEDICINE

## 2022-06-30 ENCOUNTER — OFFICE VISIT (OUTPATIENT)
Dept: INTERNAL MEDICINE CLINIC | Facility: CLINIC | Age: 32
End: 2022-06-30
Payer: COMMERCIAL

## 2022-06-30 VITALS
HEART RATE: 91 BPM | SYSTOLIC BLOOD PRESSURE: 114 MMHG | OXYGEN SATURATION: 99 % | DIASTOLIC BLOOD PRESSURE: 66 MMHG | TEMPERATURE: 98 F | WEIGHT: 247 LBS | HEIGHT: 66 IN | BODY MASS INDEX: 39.7 KG/M2

## 2022-06-30 DIAGNOSIS — R21 RASH: Primary | ICD-10-CM

## 2022-06-30 DIAGNOSIS — G24.5 EYE TWITCH: ICD-10-CM

## 2022-06-30 PROCEDURE — 3074F SYST BP LT 130 MM HG: CPT | Performed by: INTERNAL MEDICINE

## 2022-06-30 PROCEDURE — 3078F DIAST BP <80 MM HG: CPT | Performed by: INTERNAL MEDICINE

## 2022-06-30 PROCEDURE — 3008F BODY MASS INDEX DOCD: CPT | Performed by: INTERNAL MEDICINE

## 2022-06-30 PROCEDURE — 99213 OFFICE O/P EST LOW 20 MIN: CPT | Performed by: INTERNAL MEDICINE

## 2022-06-30 RX ORDER — TOBRAMYCIN 3 MG/ML
2 SOLUTION/ DROPS OPHTHALMIC EVERY 6 HOURS
Qty: 5 ML | Refills: 0 | Status: SHIPPED | OUTPATIENT
Start: 2022-06-30 | End: 2022-07-05

## 2022-06-30 RX ORDER — TRIAMCINOLONE ACETONIDE 1 MG/G
CREAM TOPICAL 2 TIMES DAILY PRN
Qty: 453.6 G | Refills: 1 | Status: SHIPPED | OUTPATIENT
Start: 2022-06-30

## 2022-07-08 ENCOUNTER — PATIENT MESSAGE (OUTPATIENT)
Dept: INTERNAL MEDICINE CLINIC | Facility: CLINIC | Age: 32
End: 2022-07-08

## 2022-07-15 ENCOUNTER — TELEPHONE (OUTPATIENT)
Dept: INTERNAL MEDICINE CLINIC | Facility: HOSPITAL | Age: 32
End: 2022-07-15

## 2022-07-15 ENCOUNTER — LAB ENCOUNTER (OUTPATIENT)
Dept: LAB | Age: 32
End: 2022-07-15
Attending: PREVENTIVE MEDICINE

## 2022-07-16 LAB — SARS-COV-2 RNA RESP QL NAA+PROBE: NOT DETECTED

## 2022-07-16 NOTE — TELEPHONE ENCOUNTER
Results and RTW guidelines:    COVID RESULT:    [x] Viewed by employee in [facility]. RTW plan and instructions as indicated on triage call. Manager notified. Estimated RTW date:   [] Discussed with employee   [x] Unable to reach by phone. Sent via [employer] message      Test type:    [] Rapid         [x] Alinity         [] Outside test:       [x] NEGATIVE     Ordered Alinity retest?  []Yes   [x] No (skip to RTW)   Ordered Rapid retest?   []Yes   [x] No (skip to RTW)           Notes:     RTW PLAN:    []  If COVID positive results, off work minimum of 5 days from positive test or onset of symptoms (day 0)        On day 5, if asymptomatic or mildly symptomatic (with improving symptoms) may return to work day 6          On day 5, if symptomatic, call Employee Health for RTW screening        []  COVID positive result - call Employee Health on day 5 after symptom onset. The employee needs to be cleared by Employee Health to RTW. [x] RTW immediately, continue to monitor for sx  [] RTW when sx improve; must be fever free for 24 hours w/o medications, Diarrhea/Vomiting free for 24 hours w/o medications  [] Alinity ordered; continue to monitor sx and call for new/worsening sx.   Discuss RTW guidelines with manager  [] May continue to work  [] Follow up with PCP  [] Home until further instruction from hotline with Alinity results  INSTRUCTIONS PROVIDED:  [x]  Plan as noted above  []  Length of time to obtain results   []  Quarantine instructions  []  Masking protocol   []  S/S of worsening infection/condition and importance of prompt medical re-evaluation including when to seek emergency care  [] If symptoms develop, stay home and call hotline for rapid test order    Estimated RTW date:      [] The employee voiced understanding of above plan/instructions  [x] Manager Notified

## 2022-07-29 DIAGNOSIS — E66.01 MORBID OBESITY WITH BMI OF 40.0-44.9, ADULT (HCC): ICD-10-CM

## 2022-07-29 RX ORDER — SEMAGLUTIDE 2.4 MG/.75ML
INJECTION, SOLUTION SUBCUTANEOUS
Qty: 3 ML | Refills: 2 | OUTPATIENT
Start: 2022-07-29

## 2022-07-29 NOTE — TELEPHONE ENCOUNTER
Requesting Wegovy 2.4 mg  LOV: 5/23/22  RTC: 3 months  Last Relevant Labs: na  Filled: 5/23/22 #3ml with 2 refills    Future Appointments   Date Time Provider Ila Bess   8/23/2022 11:00 AM DEB [facility] [address]     Denied refill as it is receipt confirmed at pharmacy requesting.   Not due until the end of august.

## 2022-08-02 ENCOUNTER — LAB ENCOUNTER (OUTPATIENT)
Dept: LAB | Facility: HOSPITAL | Age: 32
End: 2022-08-02
Attending: NURSE PRACTITIONER
Payer: COMMERCIAL

## 2022-08-02 DIAGNOSIS — E55.9 VITAMIN D DEFICIENCY: ICD-10-CM

## 2022-08-02 DIAGNOSIS — E66.01 MORBID OBESITY WITH BMI OF 40.0-44.9, ADULT (HCC): ICD-10-CM

## 2022-08-02 DIAGNOSIS — L65.9 HAIR THINNING: ICD-10-CM

## 2022-08-02 DIAGNOSIS — E53.8 LOW VITAMIN B12 LEVEL: ICD-10-CM

## 2022-08-02 DIAGNOSIS — Z51.81 ENCOUNTER FOR THERAPEUTIC DRUG MONITORING: ICD-10-CM

## 2022-08-02 LAB
BASOPHILS # BLD AUTO: 0.04 X10(3) UL (ref 0–0.2)
BASOPHILS NFR BLD AUTO: 0.5 %
DEPRECATED HBV CORE AB SER IA-ACNC: 11.4 NG/ML
DEPRECATED RDW RBC AUTO: 39.9 FL (ref 35.1–46.3)
EOSINOPHIL # BLD AUTO: 0.24 X10(3) UL (ref 0–0.7)
EOSINOPHIL NFR BLD AUTO: 3.1 %
ERYTHROCYTE [DISTWIDTH] IN BLOOD BY AUTOMATED COUNT: 12.5 % (ref 11–15)
FOLATE SERPL-MCNC: 16.4 NG/ML (ref 8.7–?)
HCT VFR BLD AUTO: 39.3 %
HGB BLD-MCNC: 13.2 G/DL
IMM GRANULOCYTES # BLD AUTO: 0.02 X10(3) UL (ref 0–1)
IMM GRANULOCYTES NFR BLD: 0.3 %
IRON SATN MFR SERPL: 16 %
IRON SERPL-MCNC: 71 UG/DL
LYMPHOCYTES # BLD AUTO: 1.99 X10(3) UL (ref 1–4)
LYMPHOCYTES NFR BLD AUTO: 26.1 %
MCH RBC QN AUTO: 29.3 PG (ref 26–34)
MCHC RBC AUTO-ENTMCNC: 33.6 G/DL (ref 31–37)
MCV RBC AUTO: 87.3 FL
MONOCYTES # BLD AUTO: 0.42 X10(3) UL (ref 0.1–1)
MONOCYTES NFR BLD AUTO: 5.5 %
NEUTROPHILS # BLD AUTO: 4.91 X10 (3) UL (ref 1.5–7.7)
NEUTROPHILS # BLD AUTO: 4.91 X10(3) UL (ref 1.5–7.7)
NEUTROPHILS NFR BLD AUTO: 64.5 %
PLATELET # BLD AUTO: 422 10(3)UL (ref 150–450)
RBC # BLD AUTO: 4.5 X10(6)UL
TIBC SERPL-MCNC: 456 UG/DL (ref 240–450)
TRANSFERRIN SERPL-MCNC: 306 MG/DL (ref 200–360)
VIT B12 SERPL-MCNC: 411 PG/ML (ref 193–986)
VIT D+METAB SERPL-MCNC: 18.5 NG/ML (ref 30–100)
WBC # BLD AUTO: 7.6 X10(3) UL (ref 4–11)

## 2022-08-02 PROCEDURE — 84466 ASSAY OF TRANSFERRIN: CPT

## 2022-08-02 PROCEDURE — 82728 ASSAY OF FERRITIN: CPT

## 2022-08-02 PROCEDURE — 83540 ASSAY OF IRON: CPT

## 2022-08-02 PROCEDURE — 82607 VITAMIN B-12: CPT

## 2022-08-02 PROCEDURE — 82746 ASSAY OF FOLIC ACID SERUM: CPT

## 2022-08-02 PROCEDURE — 85025 COMPLETE CBC W/AUTO DIFF WBC: CPT

## 2022-08-02 PROCEDURE — 36415 COLL VENOUS BLD VENIPUNCTURE: CPT

## 2022-08-02 PROCEDURE — 82306 VITAMIN D 25 HYDROXY: CPT

## 2022-08-15 ENCOUNTER — OFFICE VISIT (OUTPATIENT)
Dept: OBGYN CLINIC | Facility: CLINIC | Age: 32
End: 2022-08-15
Payer: COMMERCIAL

## 2022-08-15 VITALS
SYSTOLIC BLOOD PRESSURE: 106 MMHG | HEART RATE: 79 BPM | DIASTOLIC BLOOD PRESSURE: 75 MMHG | WEIGHT: 244 LBS | HEIGHT: 66 IN | BODY MASS INDEX: 39.21 KG/M2

## 2022-08-15 DIAGNOSIS — N90.89 LABIAL LESION: Primary | ICD-10-CM

## 2022-08-15 LAB
GENITAL VAGINOSIS SCREEN: NEGATIVE
TRICHOMONAS SCREEN: NEGATIVE

## 2022-08-15 PROCEDURE — 3078F DIAST BP <80 MM HG: CPT | Performed by: ADVANCED PRACTICE MIDWIFE

## 2022-08-15 PROCEDURE — 3008F BODY MASS INDEX DOCD: CPT | Performed by: ADVANCED PRACTICE MIDWIFE

## 2022-08-15 PROCEDURE — 3074F SYST BP LT 130 MM HG: CPT | Performed by: ADVANCED PRACTICE MIDWIFE

## 2022-08-15 PROCEDURE — 99213 OFFICE O/P EST LOW 20 MIN: CPT | Performed by: ADVANCED PRACTICE MIDWIFE

## 2022-08-16 LAB
HSV1 DNA SPEC QL NAA+PROBE: NEGATIVE
HSV2 DNA SPEC QL NAA+PROBE: NEGATIVE

## 2022-08-17 LAB
GENITAL VAGINOSIS SCREEN: NEGATIVE
TRICHOMONAS SCREEN: NEGATIVE

## 2022-08-19 ENCOUNTER — HOSPITAL ENCOUNTER (EMERGENCY)
Facility: HOSPITAL | Age: 32
Discharge: HOME | End: 2022-08-19
Attending: EMERGENCY MEDICINE
Payer: OTHER MISCELLANEOUS

## 2022-08-19 VITALS
BODY MASS INDEX: 37.67 KG/M2 | HEART RATE: 94 BPM | WEIGHT: 240 LBS | OXYGEN SATURATION: 100 % | RESPIRATION RATE: 18 BRPM | HEIGHT: 67 IN | TEMPERATURE: 98 F | DIASTOLIC BLOOD PRESSURE: 99 MMHG | SYSTOLIC BLOOD PRESSURE: 130 MMHG

## 2022-08-19 DIAGNOSIS — S39.012A LUMBAR STRAIN, INITIAL ENCOUNTER: Primary | ICD-10-CM

## 2022-08-19 PROCEDURE — 99283 EMERGENCY DEPT VISIT LOW MDM: CPT

## 2022-08-19 RX ORDER — PREDNISONE 20 MG/1
40 TABLET ORAL DAILY
Qty: 6 TABLET | Refills: 0 | Status: SHIPPED | OUTPATIENT
Start: 2022-08-19 | End: 2022-08-22

## 2022-08-19 RX ORDER — PREDNISONE 20 MG/1
60 TABLET ORAL ONCE
Status: COMPLETED | OUTPATIENT
Start: 2022-08-19 | End: 2022-08-19

## 2022-08-19 RX ORDER — KETOROLAC TROMETHAMINE 10 MG/1
10 TABLET, FILM COATED ORAL EVERY 6 HOURS PRN
Qty: 15 TABLET | Refills: 0 | Status: SHIPPED | OUTPATIENT
Start: 2022-08-19 | End: 2022-08-26

## 2022-08-19 RX ORDER — ACETAMINOPHEN 500 MG
1000 TABLET ORAL ONCE
Status: COMPLETED | OUTPATIENT
Start: 2022-08-19 | End: 2022-08-19

## 2022-08-19 RX ORDER — CYCLOBENZAPRINE HCL 10 MG
10 TABLET ORAL 3 TIMES DAILY PRN
Qty: 12 TABLET | Refills: 0 | Status: SHIPPED | OUTPATIENT
Start: 2022-08-19 | End: 2022-08-26

## 2022-08-19 RX ORDER — IBUPROFEN 600 MG/1
600 TABLET ORAL ONCE
Status: COMPLETED | OUTPATIENT
Start: 2022-08-19 | End: 2022-08-19

## 2022-08-23 ENCOUNTER — TELEMEDICINE (OUTPATIENT)
Dept: INTERNAL MEDICINE CLINIC | Facility: CLINIC | Age: 32
End: 2022-08-23

## 2022-08-23 ENCOUNTER — APPOINTMENT (OUTPATIENT)
Dept: OTHER | Facility: HOSPITAL | Age: 32
End: 2022-08-23
Attending: EMERGENCY MEDICINE

## 2022-08-23 DIAGNOSIS — R63.2 BINGE EATING: ICD-10-CM

## 2022-08-23 DIAGNOSIS — Z51.81 ENCOUNTER FOR THERAPEUTIC DRUG MONITORING: Primary | ICD-10-CM

## 2022-08-23 DIAGNOSIS — E66.01 MORBID OBESITY WITH BMI OF 40.0-44.9, ADULT (HCC): ICD-10-CM

## 2022-08-23 PROCEDURE — 99213 OFFICE O/P EST LOW 20 MIN: CPT | Performed by: NURSE PRACTITIONER

## 2022-08-23 RX ORDER — TIRZEPATIDE 5 MG/.5ML
5 INJECTION, SOLUTION SUBCUTANEOUS WEEKLY
Qty: 2 ML | Refills: 0 | Status: SHIPPED | OUTPATIENT
Start: 2022-08-23

## 2022-09-13 ENCOUNTER — PATIENT MESSAGE (OUTPATIENT)
Dept: INTERNAL MEDICINE CLINIC | Facility: CLINIC | Age: 32
End: 2022-09-13

## 2022-09-16 RX ORDER — TIRZEPATIDE 7.5 MG/.5ML
7.5 INJECTION, SOLUTION SUBCUTANEOUS WEEKLY
Qty: 2 ML | Refills: 0 | Status: SHIPPED | OUTPATIENT
Start: 2022-09-16

## 2022-09-23 ENCOUNTER — TELEPHONE (OUTPATIENT)
Dept: INTERNAL MEDICINE CLINIC | Facility: CLINIC | Age: 32
End: 2022-09-23

## 2022-09-23 NOTE — TELEPHONE ENCOUNTER
I called pt and explained that she can sign up for saving card coupon to get her medication, we dont do PA since she is not diabetic. She agreed to plan.

## 2022-09-27 ENCOUNTER — OFFICE VISIT (OUTPATIENT)
Dept: INTERNAL MEDICINE CLINIC | Facility: CLINIC | Age: 32
End: 2022-09-27

## 2022-09-27 VITALS
WEIGHT: 159 LBS | SYSTOLIC BLOOD PRESSURE: 112 MMHG | DIASTOLIC BLOOD PRESSURE: 80 MMHG | OXYGEN SATURATION: 96 % | BODY MASS INDEX: 24.96 KG/M2 | HEART RATE: 86 BPM | HEIGHT: 67 IN

## 2022-09-27 DIAGNOSIS — E61.1 IRON DEFICIENCY: ICD-10-CM

## 2022-09-27 DIAGNOSIS — F32.A ANXIETY AND DEPRESSION: ICD-10-CM

## 2022-09-27 DIAGNOSIS — F41.9 ANXIETY AND DEPRESSION: ICD-10-CM

## 2022-09-27 DIAGNOSIS — B97.7 HPV IN FEMALE: ICD-10-CM

## 2022-09-27 DIAGNOSIS — L65.9 HAIR THINNING: Primary | ICD-10-CM

## 2022-09-27 DIAGNOSIS — E55.9 VITAMIN D DEFICIENCY: ICD-10-CM

## 2022-09-27 PROCEDURE — 99214 OFFICE O/P EST MOD 30 MIN: CPT | Performed by: INTERNAL MEDICINE

## 2022-09-27 PROCEDURE — 3074F SYST BP LT 130 MM HG: CPT | Performed by: INTERNAL MEDICINE

## 2022-09-27 PROCEDURE — 3008F BODY MASS INDEX DOCD: CPT | Performed by: INTERNAL MEDICINE

## 2022-09-27 PROCEDURE — 3079F DIAST BP 80-89 MM HG: CPT | Performed by: INTERNAL MEDICINE

## 2022-10-03 ENCOUNTER — LAB ENCOUNTER (OUTPATIENT)
Dept: LAB | Facility: HOSPITAL | Age: 32
End: 2022-10-03
Attending: INTERNAL MEDICINE
Payer: COMMERCIAL

## 2022-10-03 ENCOUNTER — TELEPHONE (OUTPATIENT)
Dept: INTERNAL MEDICINE CLINIC | Facility: CLINIC | Age: 32
End: 2022-10-03

## 2022-10-03 DIAGNOSIS — B97.7 HPV IN FEMALE: ICD-10-CM

## 2022-10-03 DIAGNOSIS — L65.9 HAIR THINNING: ICD-10-CM

## 2022-10-03 DIAGNOSIS — E55.9 VITAMIN D DEFICIENCY: ICD-10-CM

## 2022-10-03 DIAGNOSIS — F32.A ANXIETY AND DEPRESSION: ICD-10-CM

## 2022-10-03 DIAGNOSIS — F41.9 ANXIETY AND DEPRESSION: ICD-10-CM

## 2022-10-03 DIAGNOSIS — R35.89 POLYURIA: ICD-10-CM

## 2022-10-03 DIAGNOSIS — E61.1 IRON DEFICIENCY: ICD-10-CM

## 2022-10-03 LAB
ALBUMIN SERPL-MCNC: 3.3 G/DL (ref 3.4–5)
ALBUMIN/GLOB SERPL: 0.8 {RATIO} (ref 1–2)
ALP LIVER SERPL-CCNC: 83 U/L
ALT SERPL-CCNC: 27 U/L
ANION GAP SERPL CALC-SCNC: 5 MMOL/L (ref 0–18)
AST SERPL-CCNC: 16 U/L (ref 15–37)
BASOPHILS # BLD AUTO: 0.06 X10(3) UL (ref 0–0.2)
BASOPHILS NFR BLD AUTO: 0.6 %
BILIRUB SERPL-MCNC: 0.4 MG/DL (ref 0.1–2)
BUN BLD-MCNC: 7 MG/DL (ref 7–18)
BUN/CREAT SERPL: 11.1 (ref 10–20)
CALCIUM BLD-MCNC: 8.7 MG/DL (ref 8.5–10.1)
CHLORIDE SERPL-SCNC: 109 MMOL/L (ref 98–112)
CO2 SERPL-SCNC: 26 MMOL/L (ref 21–32)
CREAT BLD-MCNC: 0.63 MG/DL
CREAT UR-SCNC: 90.3 MG/DL
DEPRECATED HBV CORE AB SER IA-ACNC: 7.9 NG/ML
DEPRECATED RDW RBC AUTO: 39.2 FL (ref 35.1–46.3)
EOSINOPHIL # BLD AUTO: 0.54 X10(3) UL (ref 0–0.7)
EOSINOPHIL NFR BLD AUTO: 5.5 %
ERYTHROCYTE [DISTWIDTH] IN BLOOD BY AUTOMATED COUNT: 12.4 % (ref 11–15)
FASTING STATUS PATIENT QL REPORTED: NO
GFR SERPLBLD BASED ON 1.73 SQ M-ARVRAT: 122 ML/MIN/1.73M2 (ref 60–?)
GLOBULIN PLAS-MCNC: 4 G/DL (ref 2.8–4.4)
GLUCOSE BLD-MCNC: 87 MG/DL (ref 70–99)
HCT VFR BLD AUTO: 37.5 %
HGB BLD-MCNC: 12.4 G/DL
IMM GRANULOCYTES # BLD AUTO: 0.03 X10(3) UL (ref 0–1)
IMM GRANULOCYTES NFR BLD: 0.3 %
IRON SATN MFR SERPL: 10 %
IRON SERPL-MCNC: 44 UG/DL
LYMPHOCYTES # BLD AUTO: 3.14 X10(3) UL (ref 1–4)
LYMPHOCYTES NFR BLD AUTO: 31.8 %
MCH RBC QN AUTO: 28.5 PG (ref 26–34)
MCHC RBC AUTO-ENTMCNC: 33.1 G/DL (ref 31–37)
MCV RBC AUTO: 86.2 FL
MONOCYTES # BLD AUTO: 0.56 X10(3) UL (ref 0.1–1)
MONOCYTES NFR BLD AUTO: 5.7 %
NEUTROPHILS # BLD AUTO: 5.54 X10 (3) UL (ref 1.5–7.7)
NEUTROPHILS # BLD AUTO: 5.54 X10(3) UL (ref 1.5–7.7)
NEUTROPHILS NFR BLD AUTO: 56.1 %
OSMOLALITY SERPL CALC.SUM OF ELEC: 287 MOSM/KG (ref 275–295)
PLATELET # BLD AUTO: 387 10(3)UL (ref 150–450)
POTASSIUM SERPL-SCNC: 4.1 MMOL/L (ref 3.5–5.1)
PROT SERPL-MCNC: 7.3 G/DL (ref 6.4–8.2)
RBC # BLD AUTO: 4.35 X10(6)UL
SODIUM SERPL-SCNC: 140 MMOL/L (ref 136–145)
T4 FREE SERPL-MCNC: 0.9 NG/DL (ref 0.8–1.7)
TIBC SERPL-MCNC: 435 UG/DL (ref 240–450)
TRANSFERRIN SERPL-MCNC: 292 MG/DL (ref 200–360)
TSI SER-ACNC: 1.52 MIU/ML (ref 0.36–3.74)
VIT D+METAB SERPL-MCNC: 22.8 NG/ML (ref 30–100)
WBC # BLD AUTO: 9.9 X10(3) UL (ref 4–11)

## 2022-10-03 PROCEDURE — 82728 ASSAY OF FERRITIN: CPT

## 2022-10-03 PROCEDURE — 83540 ASSAY OF IRON: CPT

## 2022-10-03 PROCEDURE — 82570 ASSAY OF URINE CREATININE: CPT

## 2022-10-03 PROCEDURE — 86780 TREPONEMA PALLIDUM: CPT

## 2022-10-03 PROCEDURE — 80053 COMPREHEN METABOLIC PANEL: CPT

## 2022-10-03 PROCEDURE — 36415 COLL VENOUS BLD VENIPUNCTURE: CPT

## 2022-10-03 PROCEDURE — 84443 ASSAY THYROID STIM HORMONE: CPT

## 2022-10-03 PROCEDURE — 85025 COMPLETE CBC W/AUTO DIFF WBC: CPT

## 2022-10-03 PROCEDURE — 84466 ASSAY OF TRANSFERRIN: CPT

## 2022-10-03 PROCEDURE — 83935 ASSAY OF URINE OSMOLALITY: CPT

## 2022-10-03 PROCEDURE — 82306 VITAMIN D 25 HYDROXY: CPT

## 2022-10-03 PROCEDURE — 84439 ASSAY OF FREE THYROXINE: CPT

## 2022-10-04 LAB — OSMOLALITY UR: 450 MOSM/KG (ref 300–1100)

## 2022-10-05 LAB — T PALLIDUM AB SER QL: NEGATIVE

## 2022-10-06 ENCOUNTER — TELEPHONE (OUTPATIENT)
Dept: INTERNAL MEDICINE CLINIC | Facility: CLINIC | Age: 32
End: 2022-10-06

## 2022-10-06 RX ORDER — FERROUS SULFATE 325(65) MG
325 TABLET ORAL
Qty: 90 TABLET | Refills: 3 | Status: SHIPPED | OUTPATIENT
Start: 2022-10-06

## 2022-10-06 NOTE — TELEPHONE ENCOUNTER
Please notify patient that I reviewed the blood work from Jacqueline Maciel consistent with iron deficiency  Vitamin D deficiency persists  All other test including thyroid levels are normal    Recommendations  As discussed, we should continue with iron deficiency supplementation and vitamin D supplementation. These are factors that can promote hair loss that we should try to control.     Should take ferrous sulfate 325 mg once a day  Vitamin D2/ergocalciferol 2000 units over-the-counter once a day  Continue with topical minoxidil

## 2022-10-27 ENCOUNTER — OFFICE VISIT (OUTPATIENT)
Dept: OBGYN CLINIC | Facility: CLINIC | Age: 32
End: 2022-10-27
Payer: COMMERCIAL

## 2022-10-27 VITALS
HEIGHT: 67 IN | DIASTOLIC BLOOD PRESSURE: 77 MMHG | BODY MASS INDEX: 35.31 KG/M2 | WEIGHT: 225 LBS | SYSTOLIC BLOOD PRESSURE: 116 MMHG | HEART RATE: 80 BPM

## 2022-10-27 DIAGNOSIS — Z23 NEED FOR VACCINATION: Primary | ICD-10-CM

## 2022-10-27 DIAGNOSIS — Z11.3 SCREEN FOR STD (SEXUALLY TRANSMITTED DISEASE): ICD-10-CM

## 2022-10-27 PROCEDURE — 90686 IIV4 VACC NO PRSV 0.5 ML IM: CPT | Performed by: ADVANCED PRACTICE MIDWIFE

## 2022-10-27 PROCEDURE — 90471 IMMUNIZATION ADMIN: CPT | Performed by: ADVANCED PRACTICE MIDWIFE

## 2022-10-27 PROCEDURE — 99213 OFFICE O/P EST LOW 20 MIN: CPT | Performed by: ADVANCED PRACTICE MIDWIFE

## 2022-10-28 LAB
C TRACH DNA SPEC QL NAA+PROBE: NEGATIVE
N GONORRHOEA DNA SPEC QL NAA+PROBE: NEGATIVE

## 2022-10-29 LAB — TRICHOMONAS VAGINALIS BY TMA: NEGATIVE

## 2022-11-01 RX ORDER — TIRZEPATIDE 10 MG/.5ML
10 INJECTION, SOLUTION SUBCUTANEOUS WEEKLY
Qty: 2 ML | Refills: 0 | Status: SHIPPED | OUTPATIENT
Start: 2022-11-01

## 2022-11-03 RX ORDER — TIRZEPATIDE 7.5 MG/.5ML
INJECTION, SOLUTION SUBCUTANEOUS
Qty: 2 ML | Refills: 0 | OUTPATIENT
Start: 2022-11-03

## 2022-11-27 NOTE — TELEPHONE ENCOUNTER
Requesting Mounjaro  LOV: 8/23/22  RTC: 3 months  Last Relevant Labs: no a1c  Filled: 11/1/22 #2ml with 0 refills  mounjaro 10 mg    No future appointments.

## 2022-11-28 RX ORDER — TIRZEPATIDE 10 MG/.5ML
10 INJECTION, SOLUTION SUBCUTANEOUS WEEKLY
Qty: 2 ML | Refills: 0 | OUTPATIENT
Start: 2022-11-28

## 2022-11-29 ENCOUNTER — WALK IN (OUTPATIENT)
Dept: URGENT CARE | Age: 32
End: 2022-11-29
Attending: FAMILY MEDICINE

## 2022-11-29 VITALS
DIASTOLIC BLOOD PRESSURE: 74 MMHG | OXYGEN SATURATION: 100 % | RESPIRATION RATE: 19 BRPM | HEART RATE: 87 BPM | SYSTOLIC BLOOD PRESSURE: 118 MMHG | WEIGHT: 230 LBS | TEMPERATURE: 98.1 F | BODY MASS INDEX: 37.12 KG/M2

## 2022-11-29 DIAGNOSIS — J06.9 UPPER RESPIRATORY TRACT INFECTION, UNSPECIFIED TYPE: Primary | ICD-10-CM

## 2022-11-29 DIAGNOSIS — Z20.822 EXPOSURE TO COVID-19 VIRUS: ICD-10-CM

## 2022-11-29 LAB
FLUAV AG UPPER RESP QL IA.RAPID: NEGATIVE
FLUBV AG UPPER RESP QL IA.RAPID: NEGATIVE
INTERNAL PROCEDURAL CONTROLS ACCEPTABLE: YES
INTERNAL PROCEDURAL CONTROLS ACCEPTABLE: YES
SARS-COV+SARS-COV-2 AG RESP QL IA.RAPID: NOT DETECTED

## 2022-11-29 PROCEDURE — 87804 INFLUENZA ASSAY W/OPTIC: CPT | Performed by: NURSE PRACTITIONER

## 2022-11-29 PROCEDURE — C9803 HOPD COVID-19 SPEC COLLECT: HCPCS

## 2022-11-29 PROCEDURE — 87426 SARSCOV CORONAVIRUS AG IA: CPT | Performed by: NURSE PRACTITIONER

## 2022-11-29 PROCEDURE — U0003 INFECTIOUS AGENT DETECTION BY NUCLEIC ACID (DNA OR RNA); SEVERE ACUTE RESPIRATORY SYNDROME CORONAVIRUS 2 (SARS-COV-2) (CORONAVIRUS DISEASE [COVID-19]), AMPLIFIED PROBE TECHNIQUE, MAKING USE OF HIGH THROUGHPUT TECHNOLOGIES AS DESCRIBED BY CMS-2020-01-R: HCPCS | Performed by: NURSE PRACTITIONER

## 2022-11-29 PROCEDURE — 99213 OFFICE O/P EST LOW 20 MIN: CPT

## 2022-11-29 RX ORDER — TIRZEPATIDE 12.5 MG/.5ML
12.5 INJECTION, SOLUTION SUBCUTANEOUS WEEKLY
Qty: 2 ML | Refills: 0 | Status: SHIPPED | OUTPATIENT
Start: 2022-11-29

## 2022-11-29 ASSESSMENT — ENCOUNTER SYMPTOMS
ABDOMINAL PAIN: 0
FATIGUE: 1
CHILLS: 1
EYE PAIN: 0
COUGH: 0
SHORTNESS OF BREATH: 0
TROUBLE SWALLOWING: 0
HEADACHES: 1
FEVER: 1
CONFUSION: 0
EYE REDNESS: 0
APPETITE CHANGE: 1
EYE DISCHARGE: 1

## 2022-11-30 LAB
SARS-COV-2 RNA RESP QL NAA+PROBE: NOT DETECTED
SERVICE CMNT-IMP: NORMAL
SERVICE CMNT-IMP: NORMAL

## 2022-12-20 ENCOUNTER — TELEPHONE (OUTPATIENT)
Dept: OBGYN CLINIC | Facility: CLINIC | Age: 32
End: 2022-12-20

## 2022-12-20 NOTE — TELEPHONE ENCOUNTER
Pt would like to  proff of flu vaccine ASAP.     Pt would like to  at [facility] around 4:45  Pls advise when ready for pickkup

## 2023-01-09 RX ORDER — TIRZEPATIDE 12.5 MG/.5ML
12.5 INJECTION, SOLUTION SUBCUTANEOUS WEEKLY
Qty: 2 ML | Refills: 0 | Status: CANCELLED | OUTPATIENT
Start: 2023-01-09

## 2023-01-10 NOTE — TELEPHONE ENCOUNTER
Pt left message to state she needs 15 mg Mounjaro filled. I called her back to let her know that request was sent to provider yesterday and she gets 3 working days to process this request.  I had to leave her message. She said she had an appt scheduled in her original call and our office cancelled her.   She is now scheduled in Feb.

## 2023-01-11 RX ORDER — TIRZEPATIDE 15 MG/.5ML
15 INJECTION, SOLUTION SUBCUTANEOUS WEEKLY
Qty: 2 ML | Refills: 0 | Status: SHIPPED | OUTPATIENT
Start: 2023-01-11

## 2023-01-13 NOTE — TELEPHONE ENCOUNTER
Routing refill request to provider for review/approval because:  PHQ9 overdue    Last Written Prescription Date:  9/6/22  Last Fill Quantity: 90,  # refills: 0   Last office visit: 1/3/2022 with prescribing provider:  Kimberlyn   Future Office Visit:   Next 5 appointments (look out 90 days)    Jan 30, 2023  7:50 AM  (Arrive by 7:35 AM)  Return Visit with Chuy Dunn MD  [facility] ([facility] ) [address] [place] [id number] [phone]   Feb 28, 2023 11:00 AM  (Arrive by 10:40 AM)  Adult Preventative Visit with Srinivasa Sofia DO  [facility] ([facility] ) [address] [place] [id number] [phone]         PHQ9 sent to pt via [employer].    BARI Ley RN  [facility]             [email or web address]

## 2023-01-15 PROBLEM — E61.1 IRON DEFICIENCY: Status: ACTIVE | Noted: 2023-01-15

## 2023-01-15 NOTE — PATIENT INSTRUCTIONS
- You were seen in clinic for regular annual check-up. We will await the records from Dr. Hieu Coley. We will determine the need to repeat blood tests for follow-up  - Keep up the great work with targeted weight loss over time. You are doing great with nutrition and exercise  - We need assistance from Dermatology - Dr. Manuel Nunes for the hair thinning    In the meantime, we recommended:  - For rosacea, metronidazole 1 application twice a day  - For dandruff, Use ketoconazole twice a week    -You are found to have low iron levels and low vitamin D levels. You should continue with supplementation. The goal is repletion of these vitamins and minerals can improve your anxiety, hair thinning  - You did have an abnormal Pap smear from a year ago. Please see OB/GYN- Dr. Blanche Rodrigues  -Please continue checking your blood pressures at home and notify us if they are increasing  - Please continue following up with bariatric clinic, [facility]  - Please continue to eat a varied diet including recommended servings of vegetables, fruits, and low fat dairy. Minimize high saturated fats (such as fast foods) and high sugar intake (such as soda)  - We recommend 150 minutes of moderate intensity exercise (brisk walking, swimming) weekly to maintain your current weight. Targeted weight loss will require more vigorous exercise or more than 150 minutes/week.     Return to clinic in 3 to 6 months for follow-up

## 2023-01-16 ENCOUNTER — OFFICE VISIT (OUTPATIENT)
Dept: INTERNAL MEDICINE CLINIC | Facility: CLINIC | Age: 33
End: 2023-01-16

## 2023-01-16 ENCOUNTER — PATIENT MESSAGE (OUTPATIENT)
Dept: INTERNAL MEDICINE CLINIC | Facility: CLINIC | Age: 33
End: 2023-01-16

## 2023-01-16 VITALS
SYSTOLIC BLOOD PRESSURE: 136 MMHG | HEIGHT: 67 IN | HEART RATE: 81 BPM | WEIGHT: 229 LBS | DIASTOLIC BLOOD PRESSURE: 72 MMHG | TEMPERATURE: 98 F | OXYGEN SATURATION: 99 % | BODY MASS INDEX: 35.94 KG/M2

## 2023-01-16 DIAGNOSIS — E61.1 IRON DEFICIENCY: ICD-10-CM

## 2023-01-16 DIAGNOSIS — Z13.29 SCREENING FOR THYROID DISORDER: ICD-10-CM

## 2023-01-16 DIAGNOSIS — L65.9 HAIR THINNING: ICD-10-CM

## 2023-01-16 DIAGNOSIS — Z98.890 HISTORY OF LOOP ELECTRICAL EXCISION PROCEDURE (LEEP): ICD-10-CM

## 2023-01-16 DIAGNOSIS — B97.7 HPV IN FEMALE: ICD-10-CM

## 2023-01-16 DIAGNOSIS — L71.9 ROSACEA: ICD-10-CM

## 2023-01-16 DIAGNOSIS — F32.A ANXIETY AND DEPRESSION: ICD-10-CM

## 2023-01-16 DIAGNOSIS — Z00.00 ANNUAL PHYSICAL EXAM: Primary | ICD-10-CM

## 2023-01-16 DIAGNOSIS — E55.9 VITAMIN D DEFICIENCY: ICD-10-CM

## 2023-01-16 DIAGNOSIS — F41.9 ANXIETY AND DEPRESSION: ICD-10-CM

## 2023-01-16 DIAGNOSIS — Z13.1 SCREENING FOR DIABETES MELLITUS: ICD-10-CM

## 2023-01-16 DIAGNOSIS — Z13.220 SCREENING FOR LIPOID DISORDERS: ICD-10-CM

## 2023-01-16 DIAGNOSIS — Z13.0 SCREENING FOR DEFICIENCY ANEMIA: ICD-10-CM

## 2023-01-16 RX ORDER — KETOCONAZOLE 20 MG/ML
5 SHAMPOO TOPICAL
Qty: 120 ML | Refills: 0 | Status: SHIPPED | OUTPATIENT
Start: 2023-01-16 | End: 2023-04-16

## 2023-01-16 RX ORDER — KETOCONAZOLE 20 MG/ML
5 SHAMPOO TOPICAL
Qty: 120 ML | Refills: 0 | Status: SHIPPED | OUTPATIENT
Start: 2023-01-16 | End: 2023-01-16

## 2023-01-16 RX ORDER — METHYLPREDNISOLONE 4 MG/1
TABLET ORAL
Qty: 1 EACH | Refills: 0 | Status: SHIPPED | OUTPATIENT
Start: 2023-01-16

## 2023-01-16 RX ORDER — METRONIDAZOLE 10 MG/G
1 GEL TOPICAL 2 TIMES DAILY
Qty: 60 G | Refills: 0 | Status: SHIPPED | OUTPATIENT
Start: 2023-01-16 | End: 2023-04-16

## 2023-01-16 RX ORDER — TIRZEPATIDE 12.5 MG/.5ML
12.5 INJECTION, SOLUTION SUBCUTANEOUS WEEKLY
Qty: 2 ML | Refills: 0 | Status: SHIPPED | OUTPATIENT
Start: 2023-01-16

## 2023-01-16 NOTE — TELEPHONE ENCOUNTER
Mounjaro 15 mg was sent for patient 1/11/23 and she cannot obtain.   Sent order for 12.5 mg today per her request.

## 2023-01-17 RX ORDER — TIRZEPATIDE 12.5 MG/.5ML
12.5 INJECTION, SOLUTION SUBCUTANEOUS WEEKLY
Qty: 2 ML | Refills: 0 | OUTPATIENT
Start: 2023-01-17

## 2023-01-24 ENCOUNTER — TELEPHONE (OUTPATIENT)
Dept: INTERNAL MEDICINE CLINIC | Facility: CLINIC | Age: 33
End: 2023-01-24

## 2023-01-24 DIAGNOSIS — L65.9 HAIR THINNING: Primary | ICD-10-CM

## 2023-01-24 DIAGNOSIS — Z13.1 SCREENING FOR DIABETES MELLITUS: ICD-10-CM

## 2023-01-24 DIAGNOSIS — E55.9 VITAMIN D DEFICIENCY: ICD-10-CM

## 2023-01-24 DIAGNOSIS — F41.9 ANXIETY AND DEPRESSION: ICD-10-CM

## 2023-01-24 DIAGNOSIS — Z13.0 SCREENING FOR DEFICIENCY ANEMIA: ICD-10-CM

## 2023-01-24 DIAGNOSIS — F32.A ANXIETY AND DEPRESSION: ICD-10-CM

## 2023-01-24 DIAGNOSIS — Z13.29 SCREENING FOR THYROID DISORDER: ICD-10-CM

## 2023-01-24 DIAGNOSIS — E61.1 IRON DEFICIENCY: ICD-10-CM

## 2023-01-24 NOTE — TELEPHONE ENCOUNTER
Patient is calling she saw Dr Jennifer Turner on 1/16 she completed a release of medical records for Dr Swati Shaw at that visit it is pertaining to issues with her blood. Dr Swati Shaw still has not received the record request from the office.     Patient is checking on the status of the records    Please call patient with an update [phone]    Dr Michael Faust office phone [phone]

## 2023-01-25 NOTE — TELEPHONE ENCOUNTER
We had sent it OV 1/16 - though not documented. Can we call Dr. Ashish Jain office to see if they can send over records?  Otherwise patient may have to give verbal approval for transfer of records

## 2023-01-25 NOTE — TELEPHONE ENCOUNTER
As FYI to DR. PORTER - called   [id number] [facility] office , spoke with Quincy Vasquez who will fax over records, labs - awaiting fax

## 2023-01-30 NOTE — TELEPHONE ENCOUNTER
Labs faxed to [phone]. Fax confirmation received. Spoke with patient. Relayed MD message. Pt verbalized understanding.

## 2023-01-30 NOTE — TELEPHONE ENCOUNTER
I did review outside hospital records:    Dermatology-Dr. Nithya Tate 1/24/2023-hair loss  - Acne rosacea: Soolantra 1% topical cream, possible Rhofade Cream  - Seborrheic dermatitis: Ketoconazole shampoo  - Alopecia: Not noted on examination, generalized thinning of hair. Rogaine 5% foam if labs unremarkable. There is also request her most recent set of blood work that we can provide    Did receive [facility] lab results: Per Dr. Poe No    12/20/2022  - Vaginitis panel: Negative for trichomonas/Gardnerella/Candida  - Chlamydia and gonorrhea negative. 1/9/2023  Urinalysis:Unremarkable  - Group B strep: [id number] CFU's on urine culture  - HIV nonreactive  - Hepatitis B nonreactive    11/25/2022  -Gardnerella positive, gonorrhea/media/trichomonas      12/13/2022:  - CBC: Platelets 673, otherwise within normal limits  - hCG not detected      Ultrasound pelvis 11/26/2022  - Right ovarian cyst 13 mm at largest diameter simple cyst    To the pool, please send over all lab results from outside hospital records.     Patient will need to repeat some blood tests, nonfasting

## 2023-02-01 ENCOUNTER — LAB ENCOUNTER (OUTPATIENT)
Dept: LAB | Facility: HOSPITAL | Age: 33
End: 2023-02-01
Attending: INTERNAL MEDICINE
Payer: COMMERCIAL

## 2023-02-01 DIAGNOSIS — E55.9 VITAMIN D DEFICIENCY: ICD-10-CM

## 2023-02-01 DIAGNOSIS — F32.A ANXIETY AND DEPRESSION: ICD-10-CM

## 2023-02-01 DIAGNOSIS — E61.1 IRON DEFICIENCY: ICD-10-CM

## 2023-02-01 DIAGNOSIS — Z13.1 SCREENING FOR DIABETES MELLITUS: ICD-10-CM

## 2023-02-01 DIAGNOSIS — L65.9 HAIR THINNING: ICD-10-CM

## 2023-02-01 DIAGNOSIS — L65.9 ALOPECIA: ICD-10-CM

## 2023-02-01 DIAGNOSIS — Z13.0 SCREENING FOR DEFICIENCY ANEMIA: ICD-10-CM

## 2023-02-01 DIAGNOSIS — F41.9 ANXIETY AND DEPRESSION: ICD-10-CM

## 2023-02-01 LAB
ALBUMIN SERPL-MCNC: 3.9 G/DL (ref 3.4–5)
ALBUMIN/GLOB SERPL: 1 {RATIO} (ref 1–2)
ALP LIVER SERPL-CCNC: 77 U/L
ALT SERPL-CCNC: 19 U/L
ANION GAP SERPL CALC-SCNC: 7 MMOL/L (ref 0–18)
AST SERPL-CCNC: 11 U/L (ref 15–37)
BASOPHILS # BLD AUTO: 0.04 X10(3) UL (ref 0–0.2)
BASOPHILS NFR BLD AUTO: 0.4 %
BILIRUB SERPL-MCNC: 0.3 MG/DL (ref 0.1–2)
BUN BLD-MCNC: 17 MG/DL (ref 7–18)
BUN/CREAT SERPL: 24.3 (ref 10–20)
CALCIUM BLD-MCNC: 9.5 MG/DL (ref 8.5–10.1)
CHLORIDE SERPL-SCNC: 106 MMOL/L (ref 98–112)
CO2 SERPL-SCNC: 25 MMOL/L (ref 21–32)
CREAT BLD-MCNC: 0.7 MG/DL
DEPRECATED HBV CORE AB SER IA-ACNC: 10.6 NG/ML
DEPRECATED RDW RBC AUTO: 43 FL (ref 35.1–46.3)
EOSINOPHIL # BLD AUTO: 0.33 X10(3) UL (ref 0–0.7)
EOSINOPHIL NFR BLD AUTO: 3.2 %
ERYTHROCYTE [DISTWIDTH] IN BLOOD BY AUTOMATED COUNT: 13.7 % (ref 11–15)
FASTING STATUS PATIENT QL REPORTED: NO
GFR SERPLBLD BASED ON 1.73 SQ M-ARVRAT: 119 ML/MIN/1.73M2 (ref 60–?)
GLOBULIN PLAS-MCNC: 3.9 G/DL (ref 2.8–4.4)
GLUCOSE BLD-MCNC: 90 MG/DL (ref 70–99)
HCT VFR BLD AUTO: 40.6 %
HGB BLD-MCNC: 13.4 G/DL
IMM GRANULOCYTES # BLD AUTO: 0.03 X10(3) UL (ref 0–1)
IMM GRANULOCYTES NFR BLD: 0.3 %
IRON SATN MFR SERPL: 8 %
IRON SERPL-MCNC: 36 UG/DL
LYMPHOCYTES # BLD AUTO: 2.72 X10(3) UL (ref 1–4)
LYMPHOCYTES NFR BLD AUTO: 26.8 %
MCH RBC QN AUTO: 28.3 PG (ref 26–34)
MCHC RBC AUTO-ENTMCNC: 33 G/DL (ref 31–37)
MCV RBC AUTO: 85.8 FL
MONOCYTES # BLD AUTO: 0.49 X10(3) UL (ref 0.1–1)
MONOCYTES NFR BLD AUTO: 4.8 %
NEUTROPHILS # BLD AUTO: 6.55 X10 (3) UL (ref 1.5–7.7)
NEUTROPHILS # BLD AUTO: 6.55 X10(3) UL (ref 1.5–7.7)
NEUTROPHILS NFR BLD AUTO: 64.5 %
OSMOLALITY SERPL CALC.SUM OF ELEC: 287 MOSM/KG (ref 275–295)
PLATELET # BLD AUTO: 430 10(3)UL (ref 150–450)
POTASSIUM SERPL-SCNC: 3.7 MMOL/L (ref 3.5–5.1)
PROT SERPL-MCNC: 7.8 G/DL (ref 6.4–8.2)
RBC # BLD AUTO: 4.73 X10(6)UL
SODIUM SERPL-SCNC: 138 MMOL/L (ref 136–145)
TIBC SERPL-MCNC: 457 UG/DL (ref 240–450)
TRANSFERRIN SERPL-MCNC: 307 MG/DL (ref 200–360)
TSI SER-ACNC: 1.11 MIU/ML (ref 0.36–3.74)
VIT B12 SERPL-MCNC: 260 PG/ML (ref 193–986)
VIT D+METAB SERPL-MCNC: 15.3 NG/ML (ref 30–100)
WBC # BLD AUTO: 10.2 X10(3) UL (ref 4–11)

## 2023-02-01 PROCEDURE — 86038 ANTINUCLEAR ANTIBODIES: CPT

## 2023-02-01 PROCEDURE — 86780 TREPONEMA PALLIDUM: CPT

## 2023-02-01 PROCEDURE — 84466 ASSAY OF TRANSFERRIN: CPT

## 2023-02-01 PROCEDURE — 83540 ASSAY OF IRON: CPT

## 2023-02-01 PROCEDURE — 82728 ASSAY OF FERRITIN: CPT

## 2023-02-01 PROCEDURE — 84443 ASSAY THYROID STIM HORMONE: CPT

## 2023-02-01 PROCEDURE — 85025 COMPLETE CBC W/AUTO DIFF WBC: CPT

## 2023-02-01 PROCEDURE — 82607 VITAMIN B-12: CPT

## 2023-02-01 PROCEDURE — 36415 COLL VENOUS BLD VENIPUNCTURE: CPT

## 2023-02-01 PROCEDURE — 86225 DNA ANTIBODY NATIVE: CPT

## 2023-02-01 PROCEDURE — 80053 COMPREHEN METABOLIC PANEL: CPT

## 2023-02-01 PROCEDURE — 82306 VITAMIN D 25 HYDROXY: CPT

## 2023-02-02 ENCOUNTER — TELEPHONE (OUTPATIENT)
Dept: INTERNAL MEDICINE CLINIC | Facility: CLINIC | Age: 33
End: 2023-02-02

## 2023-02-02 DIAGNOSIS — L65.9 ALOPECIA: Primary | ICD-10-CM

## 2023-02-03 LAB
DSDNA IGG SERPL IA-ACNC: 2.1 IU/ML
ENA AB SER QL IA: 0.1 UG/L
ENA AB SER QL IA: NEGATIVE
T PALLIDUM AB SER QL: NEGATIVE

## 2023-02-06 ENCOUNTER — TELEPHONE (OUTPATIENT)
Dept: INTERNAL MEDICINE CLINIC | Facility: CLINIC | Age: 33
End: 2023-02-06

## 2023-02-06 RX ORDER — ERGOCALCIFEROL 1.25 MG/1
50000 CAPSULE ORAL WEEKLY
Qty: 8 CAPSULE | Refills: 0 | Status: SHIPPED | OUTPATIENT
Start: 2023-02-06 | End: 2023-03-08

## 2023-02-06 NOTE — TELEPHONE ENCOUNTER
Spoke with patient. Relayed MD message. Pt verbalized understanding. Results faxed to Dr. Arnold Castellanos at [phone]. Fax confirmation received.

## 2023-02-06 NOTE — TELEPHONE ENCOUNTER
Please notify the patient that I reviewed her blood work:    Remains iron deficient, would continue ferrous sulfate 325 mg once a day  Vitamin D remains low, would recommend additional course of vitamin D supplementation 50,000 units once a week. Sent to pharmacy    Can follow-up with Dr. Manjit Mcneal of dermatology. Please fax all results as requested.

## 2023-02-11 RX ORDER — TIRZEPATIDE 15 MG/.5ML
15 INJECTION, SOLUTION SUBCUTANEOUS WEEKLY
Qty: 2 ML | Refills: 0 | Status: SHIPPED | OUTPATIENT
Start: 2023-02-11

## 2023-02-13 ENCOUNTER — WALK IN (OUTPATIENT)
Dept: URGENT CARE | Age: 33
End: 2023-02-13
Attending: FAMILY MEDICINE

## 2023-02-13 VITALS
OXYGEN SATURATION: 98 % | HEART RATE: 89 BPM | SYSTOLIC BLOOD PRESSURE: 122 MMHG | RESPIRATION RATE: 18 BRPM | TEMPERATURE: 97.4 F | DIASTOLIC BLOOD PRESSURE: 61 MMHG

## 2023-02-13 DIAGNOSIS — J02.9 SORE THROAT: Primary | ICD-10-CM

## 2023-02-13 LAB
INTERNAL PROCEDURAL CONTROLS ACCEPTABLE: YES
S PYO AG THROAT QL IA.RAPID: NEGATIVE

## 2023-02-13 PROCEDURE — 99212 OFFICE O/P EST SF 10 MIN: CPT

## 2023-02-13 PROCEDURE — 87081 CULTURE SCREEN ONLY: CPT | Performed by: NURSE PRACTITIONER

## 2023-02-13 PROCEDURE — 87591 N.GONORRHOEAE DNA AMP PROB: CPT | Performed by: NURSE PRACTITIONER

## 2023-02-13 PROCEDURE — 87880 STREP A ASSAY W/OPTIC: CPT | Performed by: NURSE PRACTITIONER

## 2023-02-13 RX ORDER — KETOCONAZOLE 20 MG/ML
SHAMPOO TOPICAL
COMMUNITY
Start: 2023-01-16

## 2023-02-13 RX ORDER — FERROUS SULFATE 325(65) MG
325 TABLET ORAL
COMMUNITY
Start: 2022-10-06

## 2023-02-13 ASSESSMENT — ENCOUNTER SYMPTOMS
RHINORRHEA: 0
SINUS PRESSURE: 0
VOMITING: 0
DIAPHORESIS: 0
NAUSEA: 0
SHORTNESS OF BREATH: 0
FATIGUE: 0
DIZZINESS: 0
WHEEZING: 0
COUGH: 0
APPETITE CHANGE: 0
SORE THROAT: 1
SINUS PAIN: 0
CHILLS: 0
DIARRHEA: 0
ABDOMINAL PAIN: 0
HEADACHES: 0
FEVER: 0
WOUND: 0
CHEST TIGHTNESS: 0

## 2023-02-13 ASSESSMENT — PAIN SCALES - GENERAL: PAINLEVEL: 8

## 2023-02-15 LAB
C TRACH RRNA THROAT QL NAA+PROBE: NEGATIVE
Lab: NORMAL
N GONORRHOEA RRNA THROAT QL NAA+PROBE: NEGATIVE

## 2023-02-16 LAB — S PYO SPEC QL CULT: NORMAL

## 2023-02-27 ENCOUNTER — OFFICE VISIT (OUTPATIENT)
Dept: INTERNAL MEDICINE CLINIC | Facility: CLINIC | Age: 33
End: 2023-02-27
Payer: COMMERCIAL

## 2023-02-27 VITALS
DIASTOLIC BLOOD PRESSURE: 70 MMHG | BODY MASS INDEX: 35.68 KG/M2 | WEIGHT: 222 LBS | RESPIRATION RATE: 16 BRPM | HEIGHT: 66 IN | SYSTOLIC BLOOD PRESSURE: 110 MMHG | HEART RATE: 79 BPM | OXYGEN SATURATION: 99 %

## 2023-02-27 DIAGNOSIS — Z51.81 ENCOUNTER FOR THERAPEUTIC DRUG MONITORING: Primary | ICD-10-CM

## 2023-02-27 DIAGNOSIS — E53.8 VITAMIN B12 DEFICIENCY: ICD-10-CM

## 2023-02-27 DIAGNOSIS — E55.9 VITAMIN D DEFICIENCY: ICD-10-CM

## 2023-02-27 DIAGNOSIS — E66.01 MORBID OBESITY WITH BMI OF 40.0-44.9, ADULT (HCC): ICD-10-CM

## 2023-02-27 PROCEDURE — 3074F SYST BP LT 130 MM HG: CPT | Performed by: NURSE PRACTITIONER

## 2023-02-27 PROCEDURE — 3008F BODY MASS INDEX DOCD: CPT | Performed by: NURSE PRACTITIONER

## 2023-02-27 PROCEDURE — 3078F DIAST BP <80 MM HG: CPT | Performed by: NURSE PRACTITIONER

## 2023-02-27 PROCEDURE — 99213 OFFICE O/P EST LOW 20 MIN: CPT | Performed by: NURSE PRACTITIONER

## 2023-02-27 RX ORDER — TIRZEPATIDE 15 MG/.5ML
15 INJECTION, SOLUTION SUBCUTANEOUS WEEKLY
Qty: 2 ML | Refills: 3 | Status: SHIPPED | OUTPATIENT
Start: 2023-02-27

## 2023-02-28 ENCOUNTER — TELEPHONE (OUTPATIENT)
Dept: INTERNAL MEDICINE CLINIC | Facility: CLINIC | Age: 33
End: 2023-02-28

## 2023-04-17 LAB
AMB EXT HIV AG AB COMBO: NONREACTIVE
AMB EXT HPV: NEGATIVE

## 2023-04-21 ENCOUNTER — WALK IN (OUTPATIENT)
Dept: URGENT CARE | Age: 33
End: 2023-04-21
Attending: FAMILY MEDICINE

## 2023-04-21 VITALS
BODY MASS INDEX: 34.55 KG/M2 | HEART RATE: 92 BPM | WEIGHT: 215 LBS | OXYGEN SATURATION: 98 % | SYSTOLIC BLOOD PRESSURE: 135 MMHG | TEMPERATURE: 97.3 F | HEIGHT: 66 IN | RESPIRATION RATE: 15 BRPM | DIASTOLIC BLOOD PRESSURE: 70 MMHG

## 2023-04-21 DIAGNOSIS — Z11.3 SCREEN FOR STD (SEXUALLY TRANSMITTED DISEASE): ICD-10-CM

## 2023-04-21 DIAGNOSIS — F41.8 ANXIETY ABOUT HEALTH: ICD-10-CM

## 2023-04-21 DIAGNOSIS — N94.9 VAGINAL BURNING: Primary | ICD-10-CM

## 2023-04-21 DIAGNOSIS — Z72.51 UNPROTECTED SEXUAL INTERCOURSE: ICD-10-CM

## 2023-04-21 LAB
APPEARANCE, POC: CLEAR
B-HCG UR QL: NEGATIVE
BILIRUBIN, POC: NEGATIVE
COLOR, POC: YELLOW
GLUCOSE UR-MCNC: NEGATIVE MG/DL
INTERNAL PROCEDURAL CONTROLS ACCEPTABLE: YES
KETONES, POC: NEGATIVE MG/DL
NITRITE, POC: NEGATIVE
OCCULT BLOOD, POC: NEGATIVE
PH UR: 6 [PH] (ref 5–7)
PROT UR-MCNC: NEGATIVE MG/DL
SP GR UR: 1.02 (ref 1–1.03)
UROBILINOGEN UR-MCNC: 0.2 MG/DL (ref 0–1)
WBC (LEUKOCYTE) ESTERASE, POC: NEGATIVE

## 2023-04-21 PROCEDURE — 99213 OFFICE O/P EST LOW 20 MIN: CPT

## 2023-04-21 PROCEDURE — 81003 URINALYSIS AUTO W/O SCOPE: CPT | Performed by: FAMILY MEDICINE

## 2023-04-21 PROCEDURE — 87481 CANDIDA DNA AMP PROBE: CPT | Performed by: FAMILY MEDICINE

## 2023-04-21 PROCEDURE — 81025 URINE PREGNANCY TEST: CPT | Performed by: FAMILY MEDICINE

## 2023-04-21 PROCEDURE — 87491 CHLMYD TRACH DNA AMP PROBE: CPT | Performed by: FAMILY MEDICINE

## 2023-04-21 RX ORDER — FLUCONAZOLE 150 MG/1
TABLET ORAL
Qty: 2 TABLET | Refills: 0 | Status: SHIPPED | OUTPATIENT
Start: 2023-04-21 | End: 2023-04-24

## 2023-04-21 RX ORDER — FLUCONAZOLE 150 MG/1
150 TABLET ORAL ONCE
Qty: 1 TABLET | Refills: 0 | Status: SHIPPED | OUTPATIENT
Start: 2023-04-21 | End: 2023-04-21 | Stop reason: DRUGHIGH

## 2023-04-21 RX ORDER — TIRZEPATIDE 15 MG/.5ML
INJECTION, SOLUTION SUBCUTANEOUS
COMMUNITY
Start: 2023-04-12

## 2023-04-24 LAB
BACTERIAL VAGINOSIS VAG-IMP: NOT DETECTED
C ALBICANS DNA VAG QL NAA+PROBE: NOT DETECTED
C GLABRATA DNA VAG QL NAA+PROBE: NOT DETECTED
C TRACH RRNA SPEC QL NAA+PROBE: NEGATIVE
Lab: NORMAL
M GENITALIUM DNA SPEC QL NAA+PROBE: NOT DETECTED
N GONORRHOEA RRNA SPEC QL NAA+PROBE: NEGATIVE
SERVICE CMNT-IMP: NORMAL
T VAGINALIS DNA VAG QL NAA+PROBE: NOT DETECTED

## 2023-05-02 ENCOUNTER — TELEPHONE (OUTPATIENT)
Dept: INTERNAL MEDICINE CLINIC | Facility: CLINIC | Age: 33
End: 2023-05-02

## 2023-05-03 RX ORDER — ERGOCALCIFEROL 1.25 MG/1
CAPSULE ORAL
Qty: 8 CAPSULE | Refills: 0 | Status: SHIPPED | OUTPATIENT
Start: 2023-05-03

## 2023-05-04 ENCOUNTER — TELEPHONE (OUTPATIENT)
Dept: OBGYN CLINIC | Facility: CLINIC | Age: 33
End: 2023-05-04

## 2023-05-05 NOTE — TELEPHONE ENCOUNTER
Pt states she just completed a pap w/ a new provider due to insurance & the HPV was negative.  Pt verbalized an understanding & agrees w/ plan

## 2023-06-01 ENCOUNTER — PATIENT MESSAGE (OUTPATIENT)
Dept: INTERNAL MEDICINE CLINIC | Facility: CLINIC | Age: 33
End: 2023-06-01

## 2023-06-02 ENCOUNTER — TELEPHONE (OUTPATIENT)
Dept: INTERNAL MEDICINE CLINIC | Facility: CLINIC | Age: 33
End: 2023-06-02

## 2023-06-02 NOTE — TELEPHONE ENCOUNTER
To Dr Dylon Garcia with pt, states since trying her friends weed on 5/26, she had had a sharp, pinching sensation and headache. Pinching is localized to right forehead/temple region. She feels a pinching sensation every 4 seconds. Pain is 7/10, Worsens with movement. Area is tender to touch. Pt does not recall any injury/blow to area, no LOC. Denies redness, swelling, numbness, tingling, blurry vision, COB, CP, palpitations, fever, N/V. Reports she has been hydrated with water and has tried excedrin without relief. Pt has history of migraines, states she has never felt this before. States she does not want to go to the ER and get a CT d/t cost. Pt aware Dr Chi Aguilar is out of office and states she will wait until Monday and go to ER if symptoms worsen.

## 2023-06-02 NOTE — TELEPHONE ENCOUNTER
Lets have a clinical evaluation of some sort. Urgent care is an option in which she can be seen today. Otherwise would recommend seeing me in clinic next week    In the meantime, try to manage with over-the-counter medications: Tylenol, ibuprofen as needed. Can use Excedrin -no more than 2 tablets within 24 hours. Should try to minimize its use for more than 3 days, through the weekend. Maintain good nutrition, hydration.   Try to find any other triggering factors for these headaches

## 2023-06-02 NOTE — TELEPHONE ENCOUNTER
Called patient and relayed DR. PORTER message - she verbalized understanding and will schedule brina with DR. PORTER for next week.  If headache will get worse she will go to [facility] center

## 2023-06-28 ENCOUNTER — APPOINTMENT (OUTPATIENT)
Dept: OTHER | Facility: HOSPITAL | Age: 33
End: 2023-06-28
Attending: EMERGENCY MEDICINE

## 2023-07-06 ENCOUNTER — APPOINTMENT (OUTPATIENT)
Dept: OTHER | Facility: HOSPITAL | Age: 33
End: 2023-07-06
Attending: EMERGENCY MEDICINE

## 2023-07-17 ENCOUNTER — TELEPHONE (OUTPATIENT)
Dept: INTERNAL MEDICINE CLINIC | Facility: CLINIC | Age: 33
End: 2023-07-17

## 2023-07-17 ENCOUNTER — OFFICE VISIT (OUTPATIENT)
Dept: INTERNAL MEDICINE CLINIC | Facility: CLINIC | Age: 33
End: 2023-07-17
Payer: COMMERCIAL

## 2023-07-17 VITALS
HEIGHT: 66 IN | BODY MASS INDEX: 34.39 KG/M2 | HEART RATE: 80 BPM | SYSTOLIC BLOOD PRESSURE: 110 MMHG | RESPIRATION RATE: 16 BRPM | WEIGHT: 214 LBS | DIASTOLIC BLOOD PRESSURE: 80 MMHG

## 2023-07-17 DIAGNOSIS — E66.01 MORBID OBESITY WITH BMI OF 40.0-44.9, ADULT (HCC): ICD-10-CM

## 2023-07-17 DIAGNOSIS — Z51.81 ENCOUNTER FOR THERAPEUTIC DRUG MONITORING: Primary | ICD-10-CM

## 2023-07-17 DIAGNOSIS — R63.2 BINGE EATING: ICD-10-CM

## 2023-07-17 PROCEDURE — 3008F BODY MASS INDEX DOCD: CPT | Performed by: NURSE PRACTITIONER

## 2023-07-17 PROCEDURE — 3074F SYST BP LT 130 MM HG: CPT | Performed by: NURSE PRACTITIONER

## 2023-07-17 PROCEDURE — 99213 OFFICE O/P EST LOW 20 MIN: CPT | Performed by: NURSE PRACTITIONER

## 2023-07-17 PROCEDURE — 3079F DIAST BP 80-89 MM HG: CPT | Performed by: NURSE PRACTITIONER

## 2023-07-17 RX ORDER — HYDROCORTISONE 25 MG/G
1 CREAM TOPICAL 2 TIMES DAILY
Qty: 1 EACH | Refills: 0 | Status: SHIPPED | OUTPATIENT
Start: 2023-07-17

## 2023-07-17 NOTE — TELEPHONE ENCOUNTER
Spoke with patient states she is having rectal pain and concerned she has a hemorrhoid. Patient states it feels like a cut is there, painful with bowel movements and sitting. This has been ongoing for about a week. She states she feels constipated for about 3 weeks. States she saw a little blood on the paper when she wiped last week but nothing since. Denies feeling anything protruding from anus, abdominal pain, bleeding, fever. Denies any foreign body to rectum. States she does think she had been straining to have a bowel movement due to constipation prior to rectal pain. Took an OTC laxative about 3 weeks ago, didn't really help. Patient states she has never had a hemorrhoid before. No openings in office. Advised UC to confirm as this is a new issue. Patient hoping for office visit, stating UC charges like the ER.  To Dr. Cecilia Sanchez please advise    [facility]

## 2023-07-17 NOTE — TELEPHONE ENCOUNTER
Try over-the-counter sitz bath's 2 times a day  Continue with high-fiber intake, adequate water intake  I sent over for Anusol rectal cream 1 application twice a day for up to 10 days.   If symptoms improve sooner, can discontinue the medication earlier

## 2023-07-18 ENCOUNTER — TELEPHONE (OUTPATIENT)
Dept: OBGYN CLINIC | Facility: CLINIC | Age: 33
End: 2023-07-18

## 2023-07-18 NOTE — TELEPHONE ENCOUNTER
Called patient to follow up on 1 year papsmear as recommended by provider. Pt did not answer. VM left.

## 2023-07-19 ENCOUNTER — TELEPHONE (OUTPATIENT)
Dept: INTERNAL MEDICINE CLINIC | Facility: CLINIC | Age: 33
End: 2023-07-19

## 2023-07-19 NOTE — TELEPHONE ENCOUNTER
PA needed for Wegovy 1.7 mg  CMM  [id number]    Entered in CMM awaiting decision  Joleen Monge (Foster: [id number])

## 2023-07-25 ENCOUNTER — WALK IN (OUTPATIENT)
Dept: URGENT CARE | Age: 33
End: 2023-07-25
Attending: FAMILY MEDICINE

## 2023-07-25 VITALS
WEIGHT: 214.95 LBS | RESPIRATION RATE: 20 BRPM | HEART RATE: 82 BPM | OXYGEN SATURATION: 98 % | SYSTOLIC BLOOD PRESSURE: 102 MMHG | TEMPERATURE: 97.9 F | BODY MASS INDEX: 34.69 KG/M2 | DIASTOLIC BLOOD PRESSURE: 70 MMHG

## 2023-07-25 DIAGNOSIS — K64.9 HEMORRHOIDS, UNSPECIFIED HEMORRHOID TYPE: Primary | ICD-10-CM

## 2023-07-25 PROCEDURE — 99212 OFFICE O/P EST SF 10 MIN: CPT

## 2023-07-25 RX ORDER — LIDOCAINE HCL AND HYDROCORTISONE ACETATE 30; 10 MG/G; MG/G
CREAM RECTAL 2 TIMES DAILY
Qty: 14 KIT | Refills: 0 | Status: SHIPPED | OUTPATIENT
Start: 2023-07-25

## 2023-07-25 RX ORDER — HYDROCORTISONE 25 MG/G
1 CREAM TOPICAL
COMMUNITY
Start: 2023-07-17

## 2023-07-25 ASSESSMENT — ENCOUNTER SYMPTOMS
CONSTIPATION: 0
DIARRHEA: 0
NAUSEA: 0
CONSTITUTIONAL NEGATIVE: 1
BLOOD IN STOOL: 0
ABDOMINAL PAIN: 0
VOMITING: 0
ABDOMINAL DISTENTION: 0
RESPIRATORY NEGATIVE: 1
RECTAL PAIN: 1
ANAL BLEEDING: 0

## 2023-07-27 RX ORDER — TIRZEPATIDE 15 MG/.5ML
INJECTION, SOLUTION SUBCUTANEOUS
Qty: 2 ML | Refills: 0 | OUTPATIENT
Start: 2023-07-27

## 2023-07-27 NOTE — TELEPHONE ENCOUNTER
Got fax from [employer] stating weight loss drugs are excluded from Plan. How do you want to proceed?     [id number]

## 2023-07-28 RX ORDER — METFORMIN HYDROCHLORIDE 750 MG/1
TABLET, EXTENDED RELEASE ORAL
Qty: 60 TABLET | Refills: 2 | Status: SHIPPED | OUTPATIENT
Start: 2023-07-28

## 2023-07-28 NOTE — TELEPHONE ENCOUNTER
No AOM coverage. Switch to Metformin  mg. Si tab daily with food for 7 days, then increase to 2 tabs daily. #60 with 2 refill.

## 2023-10-04 ENCOUNTER — OFFICE VISIT (OUTPATIENT)
Dept: OBGYN CLINIC | Facility: CLINIC | Age: 33
End: 2023-10-04

## 2023-10-04 VITALS
HEIGHT: 66 IN | HEART RATE: 81 BPM | BODY MASS INDEX: 34.39 KG/M2 | DIASTOLIC BLOOD PRESSURE: 74 MMHG | WEIGHT: 214 LBS | SYSTOLIC BLOOD PRESSURE: 107 MMHG

## 2023-10-04 DIAGNOSIS — Z87.42 HISTORY OF ABNORMAL CERVICAL PAP SMEAR: ICD-10-CM

## 2023-10-04 DIAGNOSIS — R10.2 PELVIC PAIN: Primary | ICD-10-CM

## 2023-10-04 DIAGNOSIS — R39.9 UTI SYMPTOMS: ICD-10-CM

## 2023-10-04 DIAGNOSIS — Z11.3 ROUTINE SCREENING FOR STI (SEXUALLY TRANSMITTED INFECTION): ICD-10-CM

## 2023-10-04 LAB
BILIRUBIN: NEGATIVE
CONTROL LINE PRESENT WITH A CLEAR BACKGROUND (YES/NO): YES YES/NO
GLUCOSE (URINE DIPSTICK): NEGATIVE MG/DL
KETONES (URINE DIPSTICK): NEGATIVE MG/DL
KIT LOT #: NORMAL NUMERIC
MULTISTIX LOT#: ABNORMAL NUMERIC
NITRITE, URINE: NEGATIVE
OCCULT BLOOD: NEGATIVE
PH, URINE: 6.5 (ref 4.5–8)
PREGNANCY TEST, URINE: NEGATIVE
PROTEIN (URINE DIPSTICK): NEGATIVE MG/DL
SPECIFIC GRAVITY: 1.01 (ref 1–1.03)
URINE-COLOR: YELLOW
UROBILINOGEN,SEMI-QN: 0.2 MG/DL (ref 0–1.9)

## 2023-10-04 PROCEDURE — 3078F DIAST BP <80 MM HG: CPT | Performed by: ADVANCED PRACTICE MIDWIFE

## 2023-10-04 PROCEDURE — 81002 URINALYSIS NONAUTO W/O SCOPE: CPT | Performed by: ADVANCED PRACTICE MIDWIFE

## 2023-10-04 PROCEDURE — 3008F BODY MASS INDEX DOCD: CPT | Performed by: ADVANCED PRACTICE MIDWIFE

## 2023-10-04 PROCEDURE — 81025 URINE PREGNANCY TEST: CPT | Performed by: ADVANCED PRACTICE MIDWIFE

## 2023-10-04 PROCEDURE — 99214 OFFICE O/P EST MOD 30 MIN: CPT | Performed by: ADVANCED PRACTICE MIDWIFE

## 2023-10-04 PROCEDURE — 3074F SYST BP LT 130 MM HG: CPT | Performed by: ADVANCED PRACTICE MIDWIFE

## 2023-10-04 RX ORDER — NAPROXEN 500 MG/1
500 TABLET ORAL 2 TIMES DAILY WITH MEALS
Qty: 30 TABLET | Refills: 0 | Status: SHIPPED | OUTPATIENT
Start: 2023-10-04

## 2023-10-04 NOTE — PROGRESS NOTES
HPI:   Patient presents with:  Gyn Exam: UTI symptoms  Sexually active new partner x 2 months no condom use. Denies any vaginal discharge  Not using contraception partner does not ejaculate    Zenon Deras who complains of burning with urination, dysuria, frequency, and vaginal odor . Also reports diffuse lower pelvic pain  She has had symptoms for 2  months . She also complains of  none . She denies back pain, cough, fever, stomach ache, and vaginal discharge. She does not have a history of recurrent UTI. She does not have a history of pyelonephritis. The following portions of the patient's history were reviewed and updated as appropriate: allergies, current medications, past family history, past medical history, past social history, past surgical history, and problem list.    REVIEW OF SYSTEMS:   Pertinent items are noted in HPI. STI testing in Sept [facility] urine and HIV blood work  EXAM:     General: alert, appears stated age, and cooperative   Abdomen: soft, non-tender, without masses or organomegaly   CVA Tenderness: absent   : normal external genitalia, no erythema, no discharge, vaginal discharge noted, and White thin discharge  Uterus normal non tender left adnexal non tender r adnexal tender     Lab review  Urine dip:  positive leukocytes        ASSESSMENT AND PLAN:     Diagnoses and all orders for this visit:    Pelvic pain  -     POC Urinalysis, Manual Dip without microscopy [[id number]]  -     Urine Culture, Routine; Future  -     US PELVIS (TRANSABDOMINAL AND TRANSVAGINAL) (CPT=76856/68698); Future  -     POC Urine pregnancy test [73614]  -     Genital vaginosis screen; Future    UTI symptoms  -     Urine Culture, Routine; Future    History of abnormal cervical Pap smear    Routine screening for STI (sexually transmitted infection)  -     HCV Antibody; Future  -     Hepatitis B Surface Antigen; Future  -     T PALLIDUM SCREENING CASCADE; Future  -     Chlamydia/Gc Amplification;  Future    Other orders  -     naproxen 500 MG Oral Tab; Take 1 tablet (500 mg total) by mouth 2 (two) times daily with meals. Pt reports normal pap this year  SATHYA for results completed       No follow-ups on file.      Rex Wyatt CNM, 5/6/2020, 3:13 PM

## 2023-10-05 ENCOUNTER — TELEPHONE (OUTPATIENT)
Dept: OBGYN CLINIC | Facility: CLINIC | Age: 33
End: 2023-10-05

## 2023-10-05 LAB
C TRACH DNA SPEC QL NAA+PROBE: NEGATIVE
N GONORRHOEA DNA SPEC QL NAA+PROBE: NEGATIVE

## 2023-10-05 RX ORDER — METRONIDAZOLE 7.5 MG/G
1 GEL VAGINAL NIGHTLY
Qty: 1 EACH | Refills: 0 | Status: SHIPPED | OUTPATIENT
Start: 2023-10-05 | End: 2023-10-10

## 2023-10-05 NOTE — TELEPHONE ENCOUNTER
Patient name and  verified    Patient given MS results and recommendations. All questions answered. Patient verbalized understanding.

## 2023-10-05 NOTE — TELEPHONE ENCOUNTER
----- Message from Harmony Rosenberg CNM sent at 10/5/2023 10:57 AM CDT -----  Culture positive for BV I put in an rx for metrogel

## 2023-10-06 ENCOUNTER — TELEPHONE (OUTPATIENT)
Dept: OBGYN CLINIC | Facility: CLINIC | Age: 33
End: 2023-10-06

## 2023-10-06 LAB
GENITAL VAGINOSIS SCREEN: POSITIVE
TRICHOMONAS SCREEN: NEGATIVE

## 2023-10-06 NOTE — TELEPHONE ENCOUNTER
Name and  verified    Patient denies any urinary symptoms. Kiana given MS results and recommendations. Verbalized understanding to call if symptoms do not resolve.

## 2023-10-06 NOTE — TELEPHONE ENCOUNTER
----- Message from Abigail Pearce CNM sent at 10/6/2023 10:36 AM CDT -----  Urine culture normal  Have her symptoms resolved?   If not she should schedule an inperson or virtual with me to discuss treatment / diagnostic options

## 2023-10-09 ENCOUNTER — TELEPHONE (OUTPATIENT)
Dept: OBGYN CLINIC | Facility: CLINIC | Age: 33
End: 2023-10-09

## 2023-10-09 NOTE — TELEPHONE ENCOUNTER
Incoming fax received from [employer]. Collected 4/17/23  HPV mRNA E6/E7- Nondetected. HIV- Nonreactive  Placed in [facility] [address] for review.

## 2023-11-03 ENCOUNTER — TELEPHONE (OUTPATIENT)
Dept: OBGYN CLINIC | Facility: CLINIC | Age: 33
End: 2023-11-03

## 2023-11-28 ENCOUNTER — TELEPHONE (OUTPATIENT)
Dept: INTERNAL MEDICINE CLINIC | Facility: CLINIC | Age: 33
End: 2023-11-28

## 2023-11-28 NOTE — TELEPHONE ENCOUNTER
Can do telephone visit now if patient able?  Otherwise ok for telephone visit 12/1 - would have to be after done seeing patients

## 2023-11-28 NOTE — TELEPHONE ENCOUNTER
Patient calling for advise regarding her mental health, depression. Asking for appointment with Dr. Cecilia Sanchez, has no openings for awhile. She is asking if she may need to see a specialist.    Working two jobs, a full time and part time jobs. Would like to take some time off from her part time job at [employer] in [facility]. She does not want to loss this job. Feels she needs some time off. Symptoms of not able to eat and sleep. Feels this may be caused from the two jobs and the weather.     Best call back number [phone]

## 2023-11-28 NOTE — TELEPHONE ENCOUNTER
To Dr. Franc Aldana with patient. Reporting worsening feelings of depression/hopelessness. Unsure if seasonal related. Feels burnt out/drained -- working two jobs. Inquiring if possible to take 2 wks leave from her part time job to rest (but does not want to lose position) -- instructed pt to reach out to the HR dept to discuss options for leave. Pt verbalized understanding. Pt reporting difficulty with sleeping. Wants to discuss anti-depressants. Denies any anxiety concerns or panic attacks. Denies suicidal ideation or homicidal ideation. Feels safe at home. Offered appt 12/1 -- pt unable to come into office, requesting telephone visit. [phone] Sarah Kilgore for telephone visit 12/1 at 8:30 am to discuss depression concerns/anti-depressants? Or do you want pt to come in for office visit? Please advise.

## 2023-12-05 ENCOUNTER — PATIENT MESSAGE (OUTPATIENT)
Dept: INTERNAL MEDICINE CLINIC | Facility: CLINIC | Age: 33
End: 2023-12-05

## 2023-12-08 RX ORDER — DEXTROAMPHETAMINE SACCHARATE, AMPHETAMINE ASPARTATE MONOHYDRATE, DEXTROAMPHETAMINE SULFATE AND AMPHETAMINE SULFATE 5; 5; 5; 5 MG/1; MG/1; MG/1; MG/1
20 CAPSULE, EXTENDED RELEASE ORAL EVERY MORNING
Qty: 30 CAPSULE | Refills: 0 | Status: SHIPPED | OUTPATIENT
Start: 2023-12-08 | End: 2024-01-07

## 2023-12-18 ENCOUNTER — HOSPITAL ENCOUNTER (EMERGENCY)
Age: 33
Discharge: HOME | End: 2023-12-18

## 2023-12-18 VITALS
BODY MASS INDEX: 37.33 KG/M2 | TEMPERATURE: 98 F | HEIGHT: 66 IN | RESPIRATION RATE: 16 BRPM | SYSTOLIC BLOOD PRESSURE: 127 MMHG | WEIGHT: 232.25 LBS | HEART RATE: 84 BPM | OXYGEN SATURATION: 100 % | DIASTOLIC BLOOD PRESSURE: 83 MMHG

## 2023-12-18 DIAGNOSIS — S09.90XA TRAUMATIC INJURY OF HEAD, INITIAL ENCOUNTER: Primary | ICD-10-CM

## 2024-01-08 ENCOUNTER — WALK IN (OUTPATIENT)
Dept: URGENT CARE | Age: 34
End: 2024-01-08
Attending: FAMILY MEDICINE

## 2024-01-08 VITALS
SYSTOLIC BLOOD PRESSURE: 127 MMHG | TEMPERATURE: 98.4 F | OXYGEN SATURATION: 100 % | HEIGHT: 66 IN | WEIGHT: 222.66 LBS | DIASTOLIC BLOOD PRESSURE: 84 MMHG | HEART RATE: 88 BPM | BODY MASS INDEX: 35.79 KG/M2 | RESPIRATION RATE: 19 BRPM

## 2024-01-08 DIAGNOSIS — J03.90 TONSILLITIS: ICD-10-CM

## 2024-01-08 DIAGNOSIS — J02.9 SORE THROAT: Primary | ICD-10-CM

## 2024-01-08 LAB
INTERNAL PROCEDURAL CONTROLS ACCEPTABLE: YES
S PYO AG THROAT QL IA.RAPID: NEGATIVE
TEST LOT EXPIRATION DATE: NORMAL
TEST LOT NUMBER: NORMAL

## 2024-01-08 PROCEDURE — 87880 STREP A ASSAY W/OPTIC: CPT | Performed by: FAMILY MEDICINE

## 2024-01-08 PROCEDURE — 87081 CULTURE SCREEN ONLY: CPT | Performed by: FAMILY MEDICINE

## 2024-01-08 RX ORDER — METHYLPREDNISOLONE 4 MG/1
4 TABLET ORAL SEE ADMIN INSTRUCTIONS
Qty: 21 TABLET | Refills: 0 | Status: SHIPPED | OUTPATIENT
Start: 2024-01-08 | End: 2024-01-14

## 2024-01-08 ASSESSMENT — PAIN SCALES - GENERAL: PAINLEVEL: 5

## 2024-01-10 LAB — S PYO SPEC QL CULT: NORMAL

## 2024-01-10 RX ORDER — METFORMIN HYDROCHLORIDE 750 MG/1
TABLET, EXTENDED RELEASE ORAL
Qty: 60 TABLET | Refills: 2 | OUTPATIENT
Start: 2024-01-10

## 2024-01-10 NOTE — TELEPHONE ENCOUNTER
Requesting   Requested Prescriptions     Pending Prescriptions Disp Refills    metFORMIN  MG Oral Tablet 24 Hr 60 tablet 2     Si tablet daily with a meal for 7 days then increase to 2 tablets daily with a meal therafter     LOV: 23  RTC: 3 months  Filled: 23 #60 with 2 refills    No future appointments.  needs appt

## 2024-02-02 ENCOUNTER — TELEPHONE (OUTPATIENT)
Dept: INTERNAL MEDICINE CLINIC | Facility: CLINIC | Age: 34
End: 2024-02-02

## 2024-02-02 NOTE — TELEPHONE ENCOUNTER
Patient is calling she is scheduled for surgery on 3/27, patient needs to schedule a presurgical appointment.  There are no openings until April, can patient be added to schedule?    Phone [phone]

## 2024-02-02 NOTE — TELEPHONE ENCOUNTER
Patient is calling to ask if Dr Gunter could give a recommendation that she can take to get her Hemoglobin regulated before the surgery    She would like something stronger than what she get OTC  Patient uses [facility]

## 2024-02-05 ENCOUNTER — PATIENT MESSAGE (OUTPATIENT)
Dept: INTERNAL MEDICINE CLINIC | Facility: CLINIC | Age: 34
End: 2024-02-05

## 2024-02-05 NOTE — TELEPHONE ENCOUNTER
Please notify the patient that we should get her in no more than 1 month (2/27 or later).  Please use any same-day sick slot, ideally for 1 hour.    I do not understand her question?  Her hemoglobin has always remained normal.  Can you please clarify her question?

## 2024-02-05 NOTE — TELEPHONE ENCOUNTER
Pt. Called stating the clearance that is scheduled on 3/1/24 is ok.  But the Coordinator wants the blood work done prior to that.  Pt. Asking Dr. Gunter to place orders for her Pre-Op labs. She will upload a list of the tests that her surgeons office is requesting.  Clinical please watch for it.

## 2024-02-05 NOTE — TELEPHONE ENCOUNTER
Patient contacted and notified that she is to be seen 2/27 or later for her pre-surgical clearance. Transferred to [facility]  to schedule per Dr. Gunter's instructions.   I did clarify with the patient the hemoglobin question. She was told in the past by her ob/gyn that her hemoglobin was low and she was concerned that it would be a problem with the surgery. Notified her that her hemoglobin has always remained normal according to our records, she was very relieved to hear that. To Dr.Prades ANDRE

## 2024-02-05 NOTE — TELEPHONE ENCOUNTER
Appt scheduled for 3/1/24, wanted to be seen as soon as possible after 2/27/24   Mercedes Flap Text: The defect edges were debeveled with a #15 scalpel blade.  Given the location of the defect, shape of the defect and the proximity to free margins a Mercedes flap was deemed most appropriate.  Using a sterile surgical marker, an appropriate advancement flap was drawn incorporating the defect and placing the expected incisions within the relaxed skin tension lines where possible. The area thus outlined was incised deep to adipose tissue with a #15 scalpel blade.  The skin margins were undermined to an appropriate distance in all directions utilizing iris scissors.

## 2024-02-05 NOTE — TELEPHONE ENCOUNTER
Uploaded Pre-op H&P form was printed and placed in MDs pre-op folder on desk  Pre-op visit scheduled for 3/1/24

## 2024-02-05 NOTE — TELEPHONE ENCOUNTER
From: Buffy Sheppard  To: Ton Gunter  Sent: 2/5/2024 9:58 AM CST  Subject: Buffy Sheppard 8-6-1990    Lab work

## 2024-02-07 NOTE — TELEPHONE ENCOUNTER
Please notify the patient that I reviewed the media tab and it shows just a preop worksheet but no listed labs.  Can she send a MyChart message or upload any other documentation to order all the appropriate test?  I will then have them ordered tonight once I review it

## 2024-02-08 NOTE — TELEPHONE ENCOUNTER
FYI to Dr. Gunter     Spoke to patient- stated she unsure of what lab tests she needs for pre op evaluation. Will call surgeon office and will call back or send message with further details

## 2024-02-13 ENCOUNTER — LAB ENCOUNTER (OUTPATIENT)
Dept: LAB | Facility: HOSPITAL | Age: 34
End: 2024-02-13
Attending: INTERNAL MEDICINE
Payer: COMMERCIAL

## 2024-02-13 DIAGNOSIS — Z01.818 PREOP EXAMINATION: ICD-10-CM

## 2024-02-13 LAB
ALBUMIN SERPL-MCNC: 4.4 G/DL (ref 3.2–4.8)
ALBUMIN/GLOB SERPL: 1.2 {RATIO} (ref 1–2)
ALP LIVER SERPL-CCNC: 65 U/L
ALT SERPL-CCNC: 12 U/L
ANION GAP SERPL CALC-SCNC: 6 MMOL/L (ref 0–18)
APTT PPP: 29.8 SECONDS (ref 23.3–35.6)
AST SERPL-CCNC: 14 U/L (ref ?–34)
BASOPHILS # BLD AUTO: 0.03 X10(3) UL (ref 0–0.2)
BASOPHILS NFR BLD AUTO: 0.4 %
BILIRUB SERPL-MCNC: 0.4 MG/DL (ref 0.3–1.2)
BUN BLD-MCNC: 9 MG/DL (ref 9–23)
BUN/CREAT SERPL: 14.8 (ref 10–20)
CALCIUM BLD-MCNC: 9.3 MG/DL (ref 8.7–10.4)
CHLORIDE SERPL-SCNC: 104 MMOL/L (ref 98–112)
CO2 SERPL-SCNC: 27 MMOL/L (ref 21–32)
CREAT BLD-MCNC: 0.61 MG/DL
DEPRECATED RDW RBC AUTO: 38.5 FL (ref 35.1–46.3)
EGFRCR SERPLBLD CKD-EPI 2021: 122 ML/MIN/1.73M2 (ref 60–?)
EOSINOPHIL # BLD AUTO: 0.16 X10(3) UL (ref 0–0.7)
EOSINOPHIL NFR BLD AUTO: 2.1 %
ERYTHROCYTE [DISTWIDTH] IN BLOOD BY AUTOMATED COUNT: 12 % (ref 11–15)
FASTING STATUS PATIENT QL REPORTED: NO
GLOBULIN PLAS-MCNC: 3.6 G/DL (ref 2.8–4.4)
GLUCOSE BLD-MCNC: 85 MG/DL (ref 70–99)
HCT VFR BLD AUTO: 40.6 %
HGB BLD-MCNC: 13.3 G/DL
IMM GRANULOCYTES # BLD AUTO: 0.01 X10(3) UL (ref 0–1)
IMM GRANULOCYTES NFR BLD: 0.1 %
INR BLD: 1.03 (ref 0.8–1.2)
LYMPHOCYTES # BLD AUTO: 2.69 X10(3) UL (ref 1–4)
LYMPHOCYTES NFR BLD AUTO: 35.7 %
MCH RBC QN AUTO: 28.2 PG (ref 26–34)
MCHC RBC AUTO-ENTMCNC: 32.8 G/DL (ref 31–37)
MCV RBC AUTO: 86.2 FL
MONOCYTES # BLD AUTO: 0.27 X10(3) UL (ref 0.1–1)
MONOCYTES NFR BLD AUTO: 3.6 %
NEUTROPHILS # BLD AUTO: 4.37 X10 (3) UL (ref 1.5–7.7)
NEUTROPHILS # BLD AUTO: 4.37 X10(3) UL (ref 1.5–7.7)
NEUTROPHILS NFR BLD AUTO: 58.1 %
OSMOLALITY SERPL CALC.SUM OF ELEC: 282 MOSM/KG (ref 275–295)
PLATELET # BLD AUTO: 412 10(3)UL (ref 150–450)
POTASSIUM SERPL-SCNC: 4 MMOL/L (ref 3.5–5.1)
PROT SERPL-MCNC: 8 G/DL (ref 5.7–8.2)
PROTHROMBIN TIME: 14.1 SECONDS (ref 11.6–14.8)
RBC # BLD AUTO: 4.71 X10(6)UL
SODIUM SERPL-SCNC: 137 MMOL/L (ref 136–145)
WBC # BLD AUTO: 7.5 X10(3) UL (ref 4–11)

## 2024-02-13 PROCEDURE — 85610 PROTHROMBIN TIME: CPT

## 2024-02-13 PROCEDURE — 36415 COLL VENOUS BLD VENIPUNCTURE: CPT

## 2024-02-13 PROCEDURE — 80053 COMPREHEN METABOLIC PANEL: CPT

## 2024-02-13 PROCEDURE — 85025 COMPLETE CBC W/AUTO DIFF WBC: CPT

## 2024-02-13 PROCEDURE — 85730 THROMBOPLASTIN TIME PARTIAL: CPT

## 2024-02-15 ENCOUNTER — TELEPHONE (OUTPATIENT)
Dept: INTERNAL MEDICINE CLINIC | Facility: CLINIC | Age: 34
End: 2024-02-15

## 2024-02-26 ENCOUNTER — TELEPHONE (OUTPATIENT)
Dept: INTERNAL MEDICINE CLINIC | Facility: CLINIC | Age: 34
End: 2024-02-26

## 2024-02-26 NOTE — TELEPHONE ENCOUNTER
Patient scheduled for surgery on 3/27/24  Surgeons office requesting copies of recent labs 2/13/24 be faxed to surgeons office   FAX#:  [phone]  Tasked to nursing

## 2024-03-01 ENCOUNTER — OFFICE VISIT (OUTPATIENT)
Dept: INTERNAL MEDICINE CLINIC | Facility: CLINIC | Age: 34
End: 2024-03-01

## 2024-03-01 VITALS
HEART RATE: 78 BPM | TEMPERATURE: 98 F | DIASTOLIC BLOOD PRESSURE: 78 MMHG | WEIGHT: 210 LBS | BODY MASS INDEX: 33.75 KG/M2 | OXYGEN SATURATION: 99 % | HEIGHT: 66 IN | SYSTOLIC BLOOD PRESSURE: 110 MMHG

## 2024-03-01 DIAGNOSIS — Z13.29 SCREENING FOR THYROID DISORDER: ICD-10-CM

## 2024-03-01 DIAGNOSIS — Z13.220 SCREENING FOR LIPOID DISORDERS: ICD-10-CM

## 2024-03-01 DIAGNOSIS — F32.A ANXIETY AND DEPRESSION: ICD-10-CM

## 2024-03-01 DIAGNOSIS — Z01.818 PREOP EXAMINATION: Primary | ICD-10-CM

## 2024-03-01 DIAGNOSIS — Z13.1 SCREENING FOR DIABETES MELLITUS: ICD-10-CM

## 2024-03-01 DIAGNOSIS — Z13.0 SCREENING FOR DEFICIENCY ANEMIA: ICD-10-CM

## 2024-03-01 DIAGNOSIS — F41.9 ANXIETY AND DEPRESSION: ICD-10-CM

## 2024-03-01 DIAGNOSIS — E61.1 IRON DEFICIENCY: ICD-10-CM

## 2024-03-01 DIAGNOSIS — E55.9 VITAMIN D DEFICIENCY: ICD-10-CM

## 2024-03-01 DIAGNOSIS — Z00.00 ANNUAL PHYSICAL EXAM: ICD-10-CM

## 2024-03-01 RX ORDER — MULTIVITAMIN WITH IRON
50 TABLET ORAL DAILY
COMMUNITY

## 2024-03-01 RX ORDER — RIBOFLAVIN (VITAMIN B2) 100 MG
100 TABLET ORAL DAILY
COMMUNITY

## 2024-03-01 RX ORDER — MULTIVIT-MIN/IRON/FOLIC ACID/K 18-600-40
CAPSULE ORAL
COMMUNITY

## 2024-03-01 NOTE — PATIENT INSTRUCTIONS
- You were seen in clinic for regular annual check-up.  We did review your preoperative labs and they remain at goal.  Your EKG is also reassuring.  Therefore you may proceed with surgery as scheduled with Dr. Lam    Recommendations:  -Repeat blood work as above for preoperative evaluation, all within normal limits  - EKG reassuring as above  - Should hold Adderall as you have been prior to surgery  -Avoid all NSAIDs including ibuprofen, Motrin, Advil, Aleve, etc. for at least 7 days  -Hold all supplements and vitamins at this time for at least 7 days      -Continue following with gynecology for well woman examinations including Pap smears and breast exams    We are happy to hear that you are ADHD symptoms are improved with medication.  This  -We discussed the potential side effects of long-term stimulant use which include high blood pressure, chest pain, palpitations, unintended weight loss due to poor appetite, insomnia, increased anxiety.  We should do periodic check ins to make sure that it is still safe to continue stimulant medication.  The goal is to control symptoms while minimizing side effects with the lowest effective dose and the shortest duration of time.  - We can consider resuming this after your surgery    -You may be due for COVID dose #3   - Please continue to eat a varied diet including recommended servings of vegetables, fruits, and low fat dairy. Minimize high saturated fats (such as fast foods) and high sugar intake (such as soda)  - We recommend 150 minutes of moderate intensity exercise (brisk walking, swimming) weekly to maintain your current weight.  Targeted weight loss will require more vigorous exercise or more than 150 minutes/week.    Return to clinic in 6 months for follow-up    Keep up the great work at home!    We did place fasting blood work to be completed prior to your next visit.

## 2024-03-01 NOTE — H&P
Buffy Bond is a 33 year old female.    HPI:     Chief Complaint   Patient presents with    Pre-Op Exam     Pt here for pre op exam due to upcoming surgery on 3/27/24 with  DR. Naldo Lam at [phone] for BBL      Ms. BOND is a 33 year old female Past medical history anxiety, migraines who presents today for annual physical examination and preoperative evaluation prior to scheduled surgery with Dr. Lam on 3/27/2024    She is excited to undergo surgery. Nervous about getting surgery however.  She would like to still lose a couple more pounds.  Scheduled for plastic surgery 3/27.    Stress is going better. Sleep is a little difficult. 5 hours of sleep    She did stop adderall for the surgery.    I reviewed and updated the PMHx, FamHx, medications, allergies, and SocHx as below with the patient    OB/GYN -follows with Dr. Nelson.  Last menstrual period: 2/9/2024    SocHX  - Home: feels safe at home  - Work: feels safe at work -- [employer],  - Hobbies: exercise, focusing on work.  - Nutrition: Has been intermittent fasting, 12 hour fasts. Eating pineapple, apples, citrus, soup, chicken, guacamole, Wok - grain/veggies, organic foods. Protein shakes.  - Physical Activity: Running, cycling, water aerobics/swimming - 4x day.    HISTORY:  Past Medical History:   Diagnosis Date    Anxiety     Migraines     Saw a chiropractor which helped with therapies.       Past Surgical History:   Procedure Laterality Date    [facility]  2014      Family History   Problem Relation Age of Onset    Diabetes Mother     Hypertension Mother     Stroke Mother         Ischemic stroke    Other (Sleep apnea) Brother     Stroke Maternal Grandfather     Other (Cancer, Lung) Paternal Grandfather     Ovarian Cancer Maternal Aunt         30's      Social History:   Social History     Socioeconomic History    Marital status: Single   Tobacco Use    Smoking status: Never    Smokeless tobacco: Never   Vaping Use    Vaping Use: Former     Substances: Nicotine, Flavoring   Substance and Sexual Activity    Alcohol use: Not Currently    Drug use: Never    Sexual activity: Not Currently     Partners: Male     Comment: No birthcontrol    Other Topics Concern    Caffeine Concern No    Exercise Yes     Comment: water aerobics    Seat Belt Yes        Medications (Active prior to today's visit):  Current Outpatient Medications   Medication Sig Dispense Refill    Cholecalciferol (VITAMIN D) 50 MCG (2000 UT) Oral Cap Take by mouth.      vitamin B-12 50 MCG Oral Tab Take 1 tablet (50 mcg total) by mouth daily.      Ascorbic Acid (VITAMIN C) 100 MG Oral Tab Take 1 tablet (100 mg total) by mouth daily.      Amphetamine-Dextroamphet ER (ADDERALL XR) 20 MG Oral Capsule SR 24 Hr Take 1 capsule (20 mg total) by mouth daily. (Patient not taking: Reported on 3/1/2024) 30 capsule 0    naproxen 500 MG Oral Tab Take 1 tablet (500 mg total) by mouth 2 (two) times daily with meals. (Patient not taking: Reported on 3/1/2024) 30 tablet 0    metFORMIN  MG Oral Tablet 24 Hr 1 tablet daily with a meal for 7 days then increase to 2 tablets daily with a meal therafter (Patient not taking: Reported on 3/1/2024) 60 tablet 2    semaglutide-weight management 1.7 MG/0.75ML Subcutaneous Solution Auto-injector Inject 0.75 mL (1.7 mg total) into the skin once a week. (Patient not taking: Reported on 3/1/2024) 3 mL 1    hydrocortisone 2.5 % External Cream Place 1 Application rectally 2 (two) times daily. (Patient not taking: Reported on 3/1/2024) 1 each 0    ERGOCALCIFEROL 1.25 MG (97846 UT) Oral Cap TAKE 1 CAPSULE BY MOUTH 1 TIME A WEEK (Patient not taking: Reported on 3/1/2024) 8 capsule 0       Allergies:  No Known Allergies      ROS:   Positive Findings indicated in BOLD    Constitutional: Fever, Chills, Weight Gain, Weight Loss - intentional, Night Sweats, Fatigue, Malaise  ENT/Mouth:  Hearing Changes, Ear Pain, Nasal Congestion, Sinus Pain, Hoarseness, Sore throat,  Rhinorrhea, Swallowing Difficulty  Eyes: Eye Pain, Swelling, Redness, Foreign Body, Discharge, Vision Changes  Cardiovascular: Chest Pain, SOB, PND, Dyspnea on Exertion, Orthopnea, Claudication, Edema, Palpitations  Respiratory: Cough, Sputum, Wheezing, Shortness of breath  Gastrointestinal: Nausea, Vomiting, Diarrhea, Constipation, Pain, Heartburn, Dysphagia, Bloody stools, Tarry stools  Genitourinary: Dysmenorrhea, Dysuria, Urinary Frequency, Hematuria, Urinary Incontinence, Urgency,  Flank Pain  Musculoskeletal: Arthralgias, Myalgias, Joint Swelling, Joint Stiffness, Back Pain, Neck Pain  Integumentary: Skin Lesions, Pruritis, Hair Changes, Jaundice, Nail changes  Neuro: Weakness, Numbness, Paresthesias, Loss of Consciousness, Syncope, Dizziness, Headache, Falls  Psych: Anxiety, Depression, Insomnia, Suicidal Ideation, Homicidal ideation, Memory Changes  Heme/Lymph: Bruising, Bleeding, Lymphadenopathy  Endocrine: Polyuria, Polydipsia, Temperature Intolerance     PHYSICAL EXAM:   Vital Signs:  Blood pressure 110/78, pulse 78, temperature 98 °F (36.7 °C), height 5' 6\" (1.676 m), weight 210 lb (95.3 kg), last menstrual period 09/29/2023, SpO2 99%, not currently breastfeeding.     Constitutional: No acute distress. Alert and oriented x 3.  Eyes: EOMI, PERRLA, clear sclera b/l  HENT: NCAT, Moist mucous membranes, Oropharynx without erythema or exudates   Neck: No JVD, no thyromegaly  Cardiovascular: S1, S2, no S3, no S4, Regular rate and rhythm, No murmurs/gallops/rubs.   Respiratory: Clear to auscultation bilaterally.  No wheezes/rales/rhonchi  Gastrointestinal: Soft, nontender, nondistended. Positive bowel sounds x 4. No rebound tenderness. No hepatomegaly, No splenomegaly  Genitourinary: No CVA tenderness bilaterally  Neurologic: No focal neurological deficits, CN II-XII intact, light touch intact  Musculoskeletal: Full range of motion of all extremities, no clubbing/swelling/edema  Skin: No lesions, No erythema,  no jaundice, Cap Refill < 2s  Psychiatric: Appropriate mood and affect  Heme/Lymph/Immune: No cervical/axillary LAD      DATA REVIEWED   Labs:  Recent Results (from the past 8760 hour(s))   CBC W/ DIFFERENTIAL    Collection Time: 02/13/24  4:42 PM   Result Value Ref Range    WBC 7.5 4.0 - 11.0 x10(3) uL    RBC 4.71 3.80 - 5.30 x10(6)uL    HGB 13.3 12.0 - 16.0 g/dL    HCT 40.6 35.0 - 48.0 %    MCV 86.2 80.0 - 100.0 fL    MCH 28.2 26.0 - 34.0 pg    MCHC 32.8 31.0 - 37.0 g/dL    RDW-SD 38.5 35.1 - 46.3 fL    RDW 12.0 11.0 - 15.0 %    .0 150.0 - 450.0 10(3)uL    Neutrophil Absolute Prelim 4.37 1.50 - 7.70 x10 (3) uL    Neutrophil Absolute 4.37 1.50 - 7.70 x10(3) uL    Lymphocyte Absolute 2.69 1.00 - 4.00 x10(3) uL    Monocyte Absolute 0.27 0.10 - 1.00 x10(3) uL    Eosinophil Absolute 0.16 0.00 - 0.70 x10(3) uL    Basophil Absolute 0.03 0.00 - 0.20 x10(3) uL    Immature Granulocyte Absolute 0.01 0.00 - 1.00 x10(3) uL    Neutrophil % 58.1 %    Lymphocyte % 35.7 %    Monocyte % 3.6 %    Eosinophil % 2.1 %    Basophil % 0.4 %    Immature Granulocyte % 0.1 %     *Note: Due to a large number of results and/or encounters for the requested time period, some results have not been displayed. A complete set of results can be found in Results Review.       Recent Results (from the past 8760 hour(s))   Comp Metabolic Panel (14) [E]    Collection Time: 02/13/24  4:42 PM   Result Value Ref Range    Glucose 85 70 - 99 mg/dL    Sodium 137 136 - 145 mmol/L    Potassium 4.0 3.5 - 5.1 mmol/L    Chloride 104 98 - 112 mmol/L    CO2 27.0 21.0 - 32.0 mmol/L    Anion Gap 6 0 - 18 mmol/L    BUN 9 9 - 23 mg/dL    Creatinine 0.61 0.55 - 1.02 mg/dL    BUN/CREA Ratio 14.8 10.0 - 20.0    Calcium, Total 9.3 8.7 - 10.4 mg/dL    Calculated Osmolality 282 275 - 295 mOsm/kg    eGFR-Cr 122 >=60 mL/min/1.73m2    ALT 12 10 - 49 U/L    AST 14 <=34 U/L    Alkaline Phosphatase 65 37 - 98 U/L    Bilirubin, Total 0.4 0.3 - 1.2 mg/dL    Total Protein 8.0  5.7 - 8.2 g/dL    Albumin 4.4 3.2 - 4.8 g/dL    Globulin  3.6 2.8 - 4.4 g/dL    A/G Ratio 1.2 1.0 - 2.0    Patient Fasting for CMP? No      *Note: Due to a large number of results and/or encounters for the requested time period, some results have not been displayed. A complete set of results can be found in Results Review.       Cholesterol, Total (mg/dL)   Date Value   11/22/2021 181     HDL Cholesterol (mg/dL)   Date Value   11/22/2021 66 (H)     LDL Cholesterol (mg/dL)   Date Value   11/22/2021 96     Triglycerides (mg/dL)   Date Value   11/22/2021 108       Last A1c value was 5.5% done 11/22/2021.    EKG 3/1/2024  Rate: 65 Rhythm: Sinus  Axis: Normal   Intervals: AZ: 136  QRS: 70   QT: 410 / [id number]    ST Abnormalities: None    Impression: Normal sinus rhythm  Comparison: No viewable comparison available      Imaging:  Mammogram 12/30/2021  Impression   CONCLUSION:  There is no mammographic evidence of malignancy in either breast.  Clinical follow-up is recommended for patient's generalized bilateral breast tenderness.  Patient may return for annual screening mammogram at the age of 41, unless other   symptoms arise.       BI-RADS CATEGORY:     DIAGNOSTIC CATEGORY 1--NEGATIVE ASSESSMENT.          Pathology:  ZACHARY 1/31/2019  SURGICAL PATHOLOGY REPORT        Diagnosis:    1.  CERVIX, 2 O'CLOCK, BIOPSY:      - LOW-GRADE SQUAMOUS INTRAEPITHELIAL LESION (DARIAN 1).    2.  CERVIX, 4 O'CLOCK, BIOPSY:      - LOW-GRADE SQUAMOUS INTRAEPITHELIAL LESION (DARIAN 1).    3.  CERVIX, 10 O'CLOCK, BIOPSY:      - LOW-GRADE SQUAMOUS INTRAEPITHELIAL LESION (DARIAN 1).     4.  ENDOCERVIX, CURETTAGE:      - ENDOCERVICAL TISSUE WITHOUT DIAGNOSTIC ABNORMALITY.    Pap smear 4/18/2022  Specimen Adequacy Satisfactory for evaluation. Endocervical or metaplastic cells present    General Categorization     Negative for intraepithelial lesion or malignancy    HPV High Risk mRNA    Positive  Abnormal         ASSESSMENT/PLAN:         Hair Loss  Ongoing  for the last few years.  Exam notable for mild receding hairline.  Broad differential includes androgenic cause, iron deficiency, thyroid disorder.  Stress seems to be better controlled compared to last visit  - Secondary work-up unremarkable  - WILDER was negative, low suspicion for rheumatological disorder  - Would continue biotin 1000 mg daily  - Would also continue with iron supplementation 325 mg ferrous sulfate.   -Refractory to trial of topical minoxidil  - This is overall improved, may have been stress related      Iron deficiency  Last set of iron studies shows deficiency, ferritin 7.9, serum iron 44 on 10/3/2022  - Repeat CBC within normal limits  - Okay to discontinue iron supplementation    Anxiety and depression  Secondary to psychosocial stressors.    -Overall much improved, not requiring medications for now     History of HPV  DARIAN I  Prior history of LEEPx2 was done.  -Pap smear 4/18/2022, positive for HPV.  Cytology negative  - CNJOVITA Vincent recommending for repeat Pap smear in 1 year  - Now established with new gynecologist, Dr. Nelson     Obesity  Following with bariatric clinic, last seen by DEB Marrero 8/23/2022 virtually  - Progress made with nutrition changes, exercise,  -Switch from Wegovy 2 months to Mounjaro 5 mg weekly for 4 weeks.  - Having great success with her weight loss regimen  - Generally done well with optimize nutrition, exercising  - Preoperative evaluation as below     Vitamin D deficiency  15.8 vitamin D  - we will repeat vitamin D postoperative setting  -Recommended 50,000 units of vitamin D weekly.     Migraines  -Seems to be well controlled     Breast tenderness  Breast nodules  Tenderness along 12:00 in bilateral breasts, palpable fibrocystic tissue, firm and rubbery in consistency.  - Mammogram 2021 normal  - Continue following with OB/GYN    Low back pain  Recent onset suspect this is due to acute musculoskeletal back pain such as muscle strain/lumbar  radiculopath  -If any flareups, would recommend initial trial of conservative therapy:    -Trial of Medrol Dosepak  -Acetaminophen 500-650 mg every 4-6 hours as needed for pain relief  -Ibuprofen 200-400 mg every 8 hours as needed for anti-inflammatory and pain relief  -For more severe pain, can consider alternative medications  - Trial of home exercise/stretches with resources provided  -Trial of physical therapy can be considered in the future    -Advised to notify clinic if still no improvement in 4 to 6 weeks.    RCRI Risk:  - History of ischemic heart disease: 0  - History of congestive heart failure: 0  - History of cerebrovascular disease: 0  - Pre-operative treatment with insulin: 0  - Pre-operative Creatinine > 2: 0     Surgery-specific risk: deferred to surgery     Score: 0 - 0.4% 30-day risk of cardiac morbidity and mortality     METs/Functional Status: >4 -patient able to perform moderate intensity exercise as above     By way of history, patient does not have any active chest pain, shortness of breath, lower extremity swelling.  He denies any history of ischemic heart disease, CHF, CVA, diabetes with the use of insulin, nor any chronic kidney disease. Examination showed no murmurs, JVD, lower extremity edema concerning for heart failure.    Patient has an RCRI risk score of 0 which confers 0.4% 30-day risk of cardiac morbidity and mortality.  The patient demonstrates functional status with METs greater than 4 as above.    No further testing is recommended other than work-up as recommended by the patient's primary surgeon Dr. Lam.  Pending these results, anticipate the patient will be medically ready for scheduled surgery of plastic surgery with Dr. Lam     Recommendations:  -Repeat blood work as above for preoperative evaluation, all within normal limits  - EKG reassuring as above  - Should hold Adderall, has been doing so  -Avoid all NSAIDs including ibuprofen, Motrin, Advil, Aleve, etc. for at  least 7 days  -Hold all supplements and vitamins at this time for at least 7 days         Orders This Visit:  Orders Placed This Encounter   Procedures    CBC With Differential With Platelet    Comp Metabolic Panel (14)    Lipid Panel    Ferritin    Iron And Tibc    Vitamin D    TSH W Reflex To Free T4       Meds This Visit:  Requested Prescriptions      No prescriptions requested or ordered in this encounter       Imaging & Referrals:  ELECTROCARDIOGRAM, COMPLETE       Health Maintenance  HTN Screen: At goal  DM Screen: Will check in the postoperative setting  HLD Screen: Will check in the postoperative setting  Osteoporosis Screen (>65 or < 65 with FRAX > 9.3%): Not indicated  HCV Screen: Considered low risk  HIV Screen: Negative - 4/17/2023  G/C/Syphilis: negative  - 4/21/2023 for all 3    Colon Cancer Screening (45-70): Not indicated  Breast Cancer Screening (40-70): Not indicated  Cervical Cancer Screening (21-64): 4/17/2023  Lung Cancer Screening (55-79 with 30 p/year and active < 15 years): Not indicated    Influenza: Xcndzjao-me-ex-date  Td/Tdap: Last Tdap: 2018, due 2028  Zoster (60+): Not indicated  HPV (19-26): Not indicated  Pneumococcal: Not indicated    Immunization History   Administered Date(s) Administered    >=3 YRS TRI  MULTIDOSE VIAL (79194) FLU CLINIC 01/02/2024    Covid-19 Vaccine Moderna 100 mcg/0.5 ml 01/11/2021, 02/08/2021    FLULAVAL 6 months & older 0.5 ml Prefilled syringe (34760) 11/17/2021, 10/27/2022    FLUZONE 6 months and older PFS 0.5 ml (12711) 11/17/2021, 10/27/2022    Fluvirin, 3 Years & >, Im 12/05/2016    Influenza 10/07/2020    MMR 01/09/2018, 07/27/2018    TDAP 12/05/2016, 07/27/2018         Return to clinic in 3 to 6 months for follow-up    Ton Gunter MD, 03/02/24, 6:49 AM

## 2024-03-02 LAB
ATRIAL RATE: 65 BPM
P AXIS: 33 DEGREES
P-R INTERVAL: 136 MS
Q-T INTERVAL: 410 MS
QRS DURATION: 70 MS
QTC CALCULATION (BEZET): 426 MS
R AXIS: 30 DEGREES
T AXIS: 30 DEGREES
VENTRICULAR RATE: 65 BPM

## 2024-03-26 ENCOUNTER — WALK IN (OUTPATIENT)
Dept: URGENT CARE | Age: 34
End: 2024-03-26
Attending: FAMILY MEDICINE

## 2024-03-26 VITALS
OXYGEN SATURATION: 98 % | TEMPERATURE: 97 F | SYSTOLIC BLOOD PRESSURE: 105 MMHG | DIASTOLIC BLOOD PRESSURE: 73 MMHG | RESPIRATION RATE: 18 BRPM | HEART RATE: 89 BPM

## 2024-03-26 DIAGNOSIS — R30.0 BURNING WITH URINATION: Primary | ICD-10-CM

## 2024-03-26 DIAGNOSIS — N89.8 VAGINAL ITCHING: ICD-10-CM

## 2024-03-26 DIAGNOSIS — N89.8 VAGINAL DISCHARGE: ICD-10-CM

## 2024-03-26 DIAGNOSIS — B37.31 VAGINAL CANDIDIASIS: ICD-10-CM

## 2024-03-26 LAB
APPEARANCE, POC: ABNORMAL
B-HCG UR QL: NEGATIVE
BILIRUBIN, POC: NEGATIVE
COLOR, POC: YELLOW
GLUCOSE UR-MCNC: NEGATIVE MG/DL
INTERNAL PROCEDURAL CONTROLS ACCEPTABLE: YES
KETONES, POC: ABNORMAL MG/DL
NITRITE, POC: NEGATIVE
OCCULT BLOOD, POC: ABNORMAL
PH UR: 6.5 [PH] (ref 5–7)
PROT UR-MCNC: NEGATIVE MG/DL
SP GR UR: 1.02 (ref 1–1.03)
TEST LOT EXPIRATION DATE: NORMAL
TEST LOT NUMBER: NORMAL
UROBILINOGEN UR-MCNC: 1 MG/DL (ref 0–1)
WBC (LEUKOCYTE) ESTERASE, POC: ABNORMAL

## 2024-03-26 PROCEDURE — 81003 URINALYSIS AUTO W/O SCOPE: CPT | Performed by: FAMILY MEDICINE

## 2024-03-26 PROCEDURE — 81025 URINE PREGNANCY TEST: CPT | Performed by: FAMILY MEDICINE

## 2024-03-26 PROCEDURE — 87086 URINE CULTURE/COLONY COUNT: CPT | Performed by: FAMILY MEDICINE

## 2024-03-26 RX ORDER — FLUCONAZOLE 150 MG/1
TABLET ORAL
Qty: 2 TABLET | Refills: 0 | Status: ON HOLD | OUTPATIENT
Start: 2024-03-26 | End: 2024-03-28

## 2024-03-26 ASSESSMENT — ENCOUNTER SYMPTOMS
GASTROINTESTINAL NEGATIVE: 1
RESPIRATORY NEGATIVE: 1
CHILLS: 0
NAUSEA: 0
EYES NEGATIVE: 1
ABDOMINAL PAIN: 0
FATIGUE: 0
NEUROLOGICAL NEGATIVE: 1
CONSTITUTIONAL NEGATIVE: 1
HEMATOLOGIC/LYMPHATIC NEGATIVE: 1
FEVER: 0
VOMITING: 0
DIARRHEA: 0
ENDOCRINE NEGATIVE: 1
BACK PAIN: 0

## 2024-03-26 ASSESSMENT — PAIN SCALES - GENERAL: PAINLEVEL: 0

## 2024-03-27 ENCOUNTER — HOSPITAL ENCOUNTER (OUTPATIENT)
Age: 34
Setting detail: OBSERVATION
Discharge: HOME | End: 2024-03-28
Attending: EMERGENCY MEDICINE | Admitting: INTERNAL MEDICINE

## 2024-03-27 ENCOUNTER — WALK IN (OUTPATIENT)
Dept: URGENT CARE | Age: 34
End: 2024-03-27
Attending: FAMILY MEDICINE

## 2024-03-27 ENCOUNTER — TELEPHONE (OUTPATIENT)
Dept: INTERNAL MEDICINE CLINIC | Facility: CLINIC | Age: 34
End: 2024-03-27

## 2024-03-27 VITALS
HEIGHT: 66 IN | WEIGHT: 205 LBS | SYSTOLIC BLOOD PRESSURE: 109 MMHG | DIASTOLIC BLOOD PRESSURE: 79 MMHG | TEMPERATURE: 98 F | OXYGEN SATURATION: 98 % | RESPIRATION RATE: 18 BRPM | BODY MASS INDEX: 32.95 KG/M2 | HEART RATE: 88 BPM

## 2024-03-27 DIAGNOSIS — N89.8 VAGINAL DISCHARGE: Primary | ICD-10-CM

## 2024-03-27 DIAGNOSIS — N76.0 ACUTE VAGINITIS: ICD-10-CM

## 2024-03-27 DIAGNOSIS — G89.18 ACUTE POST-OPERATIVE PAIN: Primary | ICD-10-CM

## 2024-03-27 DIAGNOSIS — Z41.1 ENCOUNTER FOR COSMETIC SURGERY: ICD-10-CM

## 2024-03-27 LAB
ALBUMIN SERPL-MCNC: 3.2 G/DL (ref 3.6–5.1)
ALBUMIN/GLOB SERPL: 0.7 {RATIO} (ref 1–2.4)
ALP SERPL-CCNC: 66 UNITS/L (ref 45–117)
ALT SERPL-CCNC: 23 UNITS/L
ANION GAP SERPL CALC-SCNC: 15 MMOL/L (ref 7–19)
AST SERPL-CCNC: 16 UNITS/L
BACTERIA UR CULT: NORMAL
BASOPHILS # BLD: 0 K/MCL (ref 0–0.3)
BASOPHILS NFR BLD: 0 %
BILIRUB SERPL-MCNC: 0.3 MG/DL (ref 0.2–1)
BUN SERPL-MCNC: 8 MG/DL (ref 6–20)
BUN/CREAT SERPL: 17 (ref 7–25)
CALCIUM SERPL-MCNC: 7.9 MG/DL (ref 8.4–10.2)
CHLORIDE SERPL-SCNC: 103 MMOL/L (ref 97–110)
CO2 SERPL-SCNC: 21 MMOL/L (ref 21–32)
CREAT SERPL-MCNC: 0.48 MG/DL (ref 0.51–0.95)
DEPRECATED RDW RBC: 41.9 FL (ref 39–50)
EGFRCR SERPLBLD CKD-EPI 2021: >90 ML/MIN/{1.73_M2}
EOSINOPHIL # BLD: 0 K/MCL (ref 0–0.5)
EOSINOPHIL NFR BLD: 0 %
ERYTHROCYTE [DISTWIDTH] IN BLOOD: 12.8 % (ref 11–15)
FASTING DURATION TIME PATIENT: ABNORMAL H
GLOBULIN SER-MCNC: 4.3 G/DL (ref 2–4)
GLUCOSE SERPL-MCNC: 127 MG/DL (ref 70–99)
HCT VFR BLD CALC: 36.5 % (ref 36–46.5)
HGB BLD-MCNC: 11.8 G/DL (ref 12–15.5)
IMM GRANULOCYTES # BLD AUTO: 0.1 K/MCL (ref 0–0.2)
IMM GRANULOCYTES # BLD: 1 %
LACTATE BLDV-SCNC: 1.3 MMOL/L (ref 0–2)
LYMPHOCYTES # BLD: 0.8 K/MCL (ref 1–4.8)
LYMPHOCYTES NFR BLD: 4 %
MCH RBC QN AUTO: 28.9 PG (ref 26–34)
MCHC RBC AUTO-ENTMCNC: 32.3 G/DL (ref 32–36.5)
MCV RBC AUTO: 89.5 FL (ref 78–100)
MONOCYTES # BLD: 0.6 K/MCL (ref 0.3–0.9)
MONOCYTES NFR BLD: 3 %
NEUTROPHILS # BLD: 19.9 K/MCL (ref 1.8–7.7)
NEUTROPHILS NFR BLD: 92 %
NRBC BLD MANUAL-RTO: 0 /100 WBC
PLATELET # BLD AUTO: 424 K/MCL (ref 140–450)
POTASSIUM SERPL-SCNC: 4.1 MMOL/L (ref 3.4–5.1)
PROT SERPL-MCNC: 7.5 G/DL (ref 6.4–8.2)
RBC # BLD: 4.08 MIL/MCL (ref 4–5.2)
SODIUM SERPL-SCNC: 135 MMOL/L (ref 135–145)
WBC # BLD: 21.4 K/MCL (ref 4.2–11)

## 2024-03-27 PROCEDURE — G0378 HOSPITAL OBSERVATION PER HR: HCPCS

## 2024-03-27 PROCEDURE — 10002807 HB RX 258: Performed by: EMERGENCY MEDICINE

## 2024-03-27 PROCEDURE — 96375 TX/PRO/DX INJ NEW DRUG ADDON: CPT

## 2024-03-27 PROCEDURE — 10004651 HB RX, NO CHARGE ITEM: Performed by: INTERNAL MEDICINE

## 2024-03-27 PROCEDURE — 96376 TX/PRO/DX INJ SAME DRUG ADON: CPT

## 2024-03-27 PROCEDURE — 83605 ASSAY OF LACTIC ACID: CPT | Performed by: EMERGENCY MEDICINE

## 2024-03-27 PROCEDURE — 99284 EMERGENCY DEPT VISIT MOD MDM: CPT

## 2024-03-27 PROCEDURE — 10002800 HB RX 250 W HCPCS

## 2024-03-27 PROCEDURE — 81513 NFCT DS BV RNA VAG FLU ALG: CPT | Performed by: FAMILY MEDICINE

## 2024-03-27 PROCEDURE — 85025 COMPLETE CBC W/AUTO DIFF WBC: CPT

## 2024-03-27 PROCEDURE — 96374 THER/PROPH/DIAG INJ IV PUSH: CPT

## 2024-03-27 PROCEDURE — 80053 COMPREHEN METABOLIC PANEL: CPT

## 2024-03-27 PROCEDURE — 87491 CHLMYD TRACH DNA AMP PROBE: CPT | Performed by: FAMILY MEDICINE

## 2024-03-27 PROCEDURE — 87481 CANDIDA DNA AMP PROBE: CPT | Performed by: FAMILY MEDICINE

## 2024-03-27 PROCEDURE — 10002800 HB RX 250 W HCPCS: Performed by: EMERGENCY MEDICINE

## 2024-03-27 RX ORDER — KETOROLAC TROMETHAMINE 15 MG/ML
15 INJECTION, SOLUTION INTRAMUSCULAR; INTRAVENOUS ONCE
Status: COMPLETED | OUTPATIENT
Start: 2024-03-27 | End: 2024-03-27

## 2024-03-27 RX ORDER — BISACODYL 10 MG
10 SUPPOSITORY, RECTAL RECTAL DAILY PRN
Status: DISCONTINUED | OUTPATIENT
Start: 2024-03-27 | End: 2024-03-28 | Stop reason: HOSPADM

## 2024-03-27 RX ORDER — ONDANSETRON 2 MG/ML
4 INJECTION INTRAMUSCULAR; INTRAVENOUS EVERY 12 HOURS PRN
Status: DISCONTINUED | OUTPATIENT
Start: 2024-03-27 | End: 2024-03-28 | Stop reason: HOSPADM

## 2024-03-27 RX ORDER — AMOXICILLIN 250 MG
2 CAPSULE ORAL 2 TIMES DAILY PRN
Status: DISCONTINUED | OUTPATIENT
Start: 2024-03-27 | End: 2024-03-28 | Stop reason: HOSPADM

## 2024-03-27 RX ORDER — ACETAMINOPHEN 325 MG/1
650 TABLET ORAL EVERY 4 HOURS PRN
Status: DISCONTINUED | OUTPATIENT
Start: 2024-03-27 | End: 2024-03-28 | Stop reason: HOSPADM

## 2024-03-27 RX ORDER — ACETAMINOPHEN 650 MG/1
650 SUPPOSITORY RECTAL EVERY 4 HOURS PRN
Status: DISCONTINUED | OUTPATIENT
Start: 2024-03-27 | End: 2024-03-28 | Stop reason: HOSPADM

## 2024-03-27 RX ORDER — LANOLIN ALCOHOL/MO/W.PET/CERES
3 CREAM (GRAM) TOPICAL NIGHTLY PRN
Status: DISCONTINUED | OUTPATIENT
Start: 2024-03-27 | End: 2024-03-28 | Stop reason: HOSPADM

## 2024-03-27 RX ORDER — POLYETHYLENE GLYCOL 3350 17 G/17G
17 POWDER, FOR SOLUTION ORAL DAILY PRN
Status: DISCONTINUED | OUTPATIENT
Start: 2024-03-27 | End: 2024-03-28 | Stop reason: HOSPADM

## 2024-03-27 RX ORDER — ONDANSETRON 4 MG/1
4 TABLET, ORALLY DISINTEGRATING ORAL EVERY 12 HOURS PRN
Status: DISCONTINUED | OUTPATIENT
Start: 2024-03-27 | End: 2024-03-28 | Stop reason: HOSPADM

## 2024-03-27 RX ORDER — HYDROCODONE BITARTRATE AND ACETAMINOPHEN 5; 325 MG/1; MG/1
1 TABLET ORAL EVERY 4 HOURS PRN
Status: DISCONTINUED | OUTPATIENT
Start: 2024-03-27 | End: 2024-03-28

## 2024-03-27 RX ORDER — 0.9 % SODIUM CHLORIDE 0.9 %
2 VIAL (ML) INJECTION EVERY 12 HOURS SCHEDULED
Status: DISCONTINUED | OUTPATIENT
Start: 2024-03-28 | End: 2024-03-28 | Stop reason: HOSPADM

## 2024-03-27 RX ADMIN — SODIUM CHLORIDE, PRESERVATIVE FREE 2 ML: 5 INJECTION INTRAVENOUS at 23:50

## 2024-03-27 RX ADMIN — MORPHINE SULFATE 4 MG: 4 INJECTION INTRAVENOUS at 20:24

## 2024-03-27 RX ADMIN — KETOROLAC TROMETHAMINE 15 MG: 15 INJECTION, SOLUTION INTRAMUSCULAR; INTRAVENOUS at 21:45

## 2024-03-27 RX ADMIN — SODIUM CHLORIDE 1000 ML: 9 INJECTION, SOLUTION INTRAVENOUS at 20:29

## 2024-03-27 RX ADMIN — MORPHINE SULFATE 4 MG: 4 INJECTION INTRAVENOUS at 21:46

## 2024-03-27 ASSESSMENT — PAIN SCALES - GENERAL
PAINLEVEL_OUTOF10: 5
PAINLEVEL_OUTOF10: 6
PAINLEVEL_OUTOF10: 3
PAINLEVEL_OUTOF10: 6
PAINLEVEL_OUTOF10: 10

## 2024-03-27 ASSESSMENT — ENCOUNTER SYMPTOMS
EYES NEGATIVE: 1
RESPIRATORY NEGATIVE: 1
ENDOCRINE NEGATIVE: 1
HEMATOLOGIC/LYMPHATIC NEGATIVE: 1
CONSTITUTIONAL NEGATIVE: 1
NEUROLOGICAL NEGATIVE: 1
GASTROINTESTINAL NEGATIVE: 1

## 2024-03-27 NOTE — TELEPHONE ENCOUNTER
Pt had liposuction today - she is in a lot of pain and asking if pain relievers can be prescribed for her    Dr that did the procedure doesn't prescribe them     Pharmacy - [facility] in [place]     Call back # [phone]

## 2024-03-28 ENCOUNTER — TELEPHONE (OUTPATIENT)
Dept: URGENT CARE | Age: 34
End: 2024-03-28

## 2024-03-28 VITALS
SYSTOLIC BLOOD PRESSURE: 125 MMHG | HEIGHT: 66 IN | BODY MASS INDEX: 33.11 KG/M2 | TEMPERATURE: 97.2 F | WEIGHT: 206 LBS | DIASTOLIC BLOOD PRESSURE: 85 MMHG | RESPIRATION RATE: 20 BRPM | HEART RATE: 109 BPM | OXYGEN SATURATION: 100 %

## 2024-03-28 LAB
ALBUMIN SERPL-MCNC: 2.7 G/DL (ref 3.6–5.1)
ALBUMIN/GLOB SERPL: 0.7 {RATIO} (ref 1–2.4)
ALP SERPL-CCNC: 53 UNITS/L (ref 45–117)
ALT SERPL-CCNC: 18 UNITS/L
ANION GAP SERPL CALC-SCNC: 16 MMOL/L (ref 7–19)
AST SERPL-CCNC: 13 UNITS/L
BACTERIAL VAGINOSIS VAG-IMP: NOT DETECTED
BASOPHILS # BLD: 0 K/MCL (ref 0–0.3)
BASOPHILS NFR BLD: 0 %
BILIRUB SERPL-MCNC: 0.3 MG/DL (ref 0.2–1)
BUN SERPL-MCNC: 8 MG/DL (ref 6–20)
BUN/CREAT SERPL: 18 (ref 7–25)
C ALBICANS DNA VAG QL NAA+PROBE: POSITIVE
C GLABRATA DNA VAG QL NAA+PROBE: NOT DETECTED
C TRACH RRNA SPEC QL NAA+PROBE: NEGATIVE
CALCIUM SERPL-MCNC: 7.6 MG/DL (ref 8.4–10.2)
CHLORIDE SERPL-SCNC: 105 MMOL/L (ref 97–110)
CO2 SERPL-SCNC: 20 MMOL/L (ref 21–32)
CREAT SERPL-MCNC: 0.45 MG/DL (ref 0.51–0.95)
DEPRECATED RDW RBC: 43.4 FL (ref 39–50)
EGFRCR SERPLBLD CKD-EPI 2021: >90 ML/MIN/{1.73_M2}
EOSINOPHIL # BLD: 0 K/MCL (ref 0–0.5)
EOSINOPHIL NFR BLD: 0 %
ERYTHROCYTE [DISTWIDTH] IN BLOOD: 13 % (ref 11–15)
FASTING DURATION TIME PATIENT: ABNORMAL H
GLOBULIN SER-MCNC: 3.8 G/DL (ref 2–4)
GLUCOSE SERPL-MCNC: 110 MG/DL (ref 70–99)
HCT VFR BLD CALC: 31.6 % (ref 36–46.5)
HGB BLD-MCNC: 10.3 G/DL (ref 12–15.5)
IMM GRANULOCYTES # BLD AUTO: 0.1 K/MCL (ref 0–0.2)
IMM GRANULOCYTES # BLD: 0 %
LYMPHOCYTES # BLD: 1.1 K/MCL (ref 1–4.8)
LYMPHOCYTES NFR BLD: 6 %
Lab: NORMAL
MCH RBC QN AUTO: 29.9 PG (ref 26–34)
MCHC RBC AUTO-ENTMCNC: 32.6 G/DL (ref 32–36.5)
MCV RBC AUTO: 91.9 FL (ref 78–100)
MONOCYTES # BLD: 1.5 K/MCL (ref 0.3–0.9)
MONOCYTES NFR BLD: 8 %
N GONORRHOEA RRNA SPEC QL NAA+PROBE: NEGATIVE
NEUTROPHILS # BLD: 14.9 K/MCL (ref 1.8–7.7)
NEUTROPHILS NFR BLD: 86 %
NRBC BLD MANUAL-RTO: 0 /100 WBC
PLATELET # BLD AUTO: 362 K/MCL (ref 140–450)
POTASSIUM SERPL-SCNC: 3.8 MMOL/L (ref 3.4–5.1)
PROT SERPL-MCNC: 6.5 G/DL (ref 6.4–8.2)
RAINBOW EXTRA TUBES HOLD SPECIMEN: NORMAL
RAINBOW EXTRA TUBES HOLD SPECIMEN: NORMAL
RBC # BLD: 3.44 MIL/MCL (ref 4–5.2)
SERVICE CMNT-IMP: ABNORMAL
SERVICE CMNT-IMP: NORMAL
SODIUM SERPL-SCNC: 137 MMOL/L (ref 135–145)
T VAGINALIS DNA VAG QL NAA+PROBE: NOT DETECTED
WBC # BLD: 17.5 K/MCL (ref 4.2–11)

## 2024-03-28 PROCEDURE — 99223 1ST HOSP IP/OBS HIGH 75: CPT | Performed by: STUDENT IN AN ORGANIZED HEALTH CARE EDUCATION/TRAINING PROGRAM

## 2024-03-28 PROCEDURE — 87040 BLOOD CULTURE FOR BACTERIA: CPT | Performed by: INTERNAL MEDICINE

## 2024-03-28 PROCEDURE — 85025 COMPLETE CBC W/AUTO DIFF WBC: CPT | Performed by: INTERNAL MEDICINE

## 2024-03-28 PROCEDURE — 10002800 HB RX 250 W HCPCS: Performed by: INTERNAL MEDICINE

## 2024-03-28 PROCEDURE — 96375 TX/PRO/DX INJ NEW DRUG ADDON: CPT

## 2024-03-28 PROCEDURE — 10002803 HB RX 637: Performed by: INTERNAL MEDICINE

## 2024-03-28 PROCEDURE — 36415 COLL VENOUS BLD VENIPUNCTURE: CPT | Performed by: INTERNAL MEDICINE

## 2024-03-28 PROCEDURE — 80053 COMPREHEN METABOLIC PANEL: CPT | Performed by: INTERNAL MEDICINE

## 2024-03-28 PROCEDURE — 96376 TX/PRO/DX INJ SAME DRUG ADON: CPT

## 2024-03-28 PROCEDURE — G0378 HOSPITAL OBSERVATION PER HR: HCPCS

## 2024-03-28 PROCEDURE — 10004651 HB RX, NO CHARGE ITEM: Performed by: INTERNAL MEDICINE

## 2024-03-28 RX ORDER — HYDROCODONE BITARTRATE AND ACETAMINOPHEN 5; 325 MG/1; MG/1
1 TABLET ORAL EVERY 4 HOURS PRN
Status: DISCONTINUED | OUTPATIENT
Start: 2024-03-28 | End: 2024-03-28 | Stop reason: HOSPADM

## 2024-03-28 RX ORDER — METOCLOPRAMIDE 10 MG/1
10 TABLET ORAL 4 TIMES DAILY
COMMUNITY

## 2024-03-28 RX ORDER — FERROUS SULFATE 325(65) MG
325 TABLET ORAL
COMMUNITY

## 2024-03-28 RX ORDER — HYDROCODONE BITARTRATE AND ACETAMINOPHEN 5; 325 MG/1; MG/1
1 TABLET ORAL EVERY 8 HOURS PRN
Qty: 21 TABLET | Refills: 0 | Status: SHIPPED | OUTPATIENT
Start: 2024-03-28

## 2024-03-28 RX ORDER — IBUPROFEN 400 MG/1
800 TABLET ORAL EVERY 6 HOURS PRN
Status: DISCONTINUED | OUTPATIENT
Start: 2024-03-28 | End: 2024-03-28

## 2024-03-28 RX ORDER — ONDANSETRON 4 MG/1
4 TABLET, ORALLY DISINTEGRATING ORAL EVERY 8 HOURS PRN
COMMUNITY

## 2024-03-28 RX ORDER — KETOROLAC TROMETHAMINE 15 MG/ML
15 INJECTION, SOLUTION INTRAMUSCULAR; INTRAVENOUS ONCE
Status: COMPLETED | OUTPATIENT
Start: 2024-03-28 | End: 2024-03-28

## 2024-03-28 RX ORDER — AMOXICILLIN AND CLAVULANATE POTASSIUM 875; 125 MG/1; MG/1
1 TABLET, FILM COATED ORAL 2 TIMES DAILY
COMMUNITY

## 2024-03-28 RX ORDER — IBUPROFEN 400 MG/1
800 TABLET ORAL EVERY 6 HOURS PRN
Status: DISCONTINUED | OUTPATIENT
Start: 2024-03-28 | End: 2024-03-28 | Stop reason: HOSPADM

## 2024-03-28 RX ADMIN — CEFAZOLIN SODIUM 2000 MG: 300 INJECTION, POWDER, LYOPHILIZED, FOR SOLUTION INTRAVENOUS at 09:07

## 2024-03-28 RX ADMIN — SODIUM CHLORIDE, PRESERVATIVE FREE 2 ML: 5 INJECTION INTRAVENOUS at 09:08

## 2024-03-28 RX ADMIN — ONDANSETRON 4 MG: 2 INJECTION INTRAMUSCULAR; INTRAVENOUS at 01:07

## 2024-03-28 RX ADMIN — SENNOSIDES AND DOCUSATE SODIUM 2 TABLET: 8.6; 5 TABLET ORAL at 11:57

## 2024-03-28 RX ADMIN — POLYETHYLENE GLYCOL 3350 17 G: 17 POWDER, FOR SOLUTION ORAL at 11:57

## 2024-03-28 RX ADMIN — CEFAZOLIN SODIUM 2000 MG: 300 INJECTION, POWDER, LYOPHILIZED, FOR SOLUTION INTRAVENOUS at 01:00

## 2024-03-28 RX ADMIN — KETOROLAC TROMETHAMINE 15 MG: 15 INJECTION, SOLUTION INTRAMUSCULAR; INTRAVENOUS at 09:16

## 2024-03-28 SDOH — ECONOMIC STABILITY: HOUSING INSECURITY: DO YOU HAVE PROBLEMS WITH ANY OF THE FOLLOWING?: NONE OF THE ABOVE

## 2024-03-28 SDOH — ECONOMIC STABILITY: FOOD INSECURITY: WITHIN THE PAST 12 MONTHS, THE FOOD YOU BOUGHT JUST DIDN'T LAST AND YOU DIDN'T HAVE MONEY TO GET MORE.: NEVER TRUE

## 2024-03-28 SDOH — HEALTH STABILITY: PHYSICAL HEALTH: DO YOU HAVE DIFFICULTY DRESSING OR BATHING?: NO

## 2024-03-28 SDOH — ECONOMIC STABILITY: TRANSPORTATION INSECURITY
IN THE PAST 12 MONTHS, HAS LACK OF RELIABLE TRANSPORTATION KEPT YOU FROM MEDICAL APPOINTMENTS, MEETINGS, WORK OR FROM GETTING THINGS NEEDED FOR DAILY LIVING?: NO

## 2024-03-28 SDOH — ECONOMIC STABILITY: INCOME INSECURITY: IN THE PAST 12 MONTHS, HAS THE ELECTRIC, GAS, OIL, OR WATER COMPANY THREATENED TO SHUT OFF SERVICE IN YOUR HOME?: NO

## 2024-03-28 SDOH — SOCIAL STABILITY: SOCIAL INSECURITY: HOW OFTEN DOES ANYONE, INCLUDING FAMILY AND FRIENDS, INSULT OR TALK DOWN TO YOU?: NEVER

## 2024-03-28 SDOH — ECONOMIC STABILITY: HOUSING INSECURITY: WHAT IS YOUR LIVING SITUATION TODAY?: I HAVE A STEADY PLACE TO LIVE

## 2024-03-28 SDOH — ECONOMIC STABILITY: HOUSING INSECURITY: WHAT IS YOUR LIVING SITUATION TODAY?: SIBLINGS

## 2024-03-28 SDOH — SOCIAL STABILITY: SOCIAL NETWORK
HOW OFTEN DO YOU SEE OR TALK TO PEOPLE THAT YOU CARE ABOUT AND FEEL CLOSE TO? (FOR EXAMPLE: TALKING TO FRIENDS ON THE PHONE, VISITING FRIENDS OR FAMILY, GOING TO CHURCH OR CLUB MEETINGS): 5 OR MORE TIMES A WEEK

## 2024-03-28 SDOH — SOCIAL STABILITY: SOCIAL INSECURITY: HOW OFTEN DOES ANYONE, INCLUDING FAMILY AND FRIENDS, SCREAM OR CURSE AT YOU?: NEVER

## 2024-03-28 SDOH — SOCIAL STABILITY: SOCIAL INSECURITY: HOW OFTEN DOES ANYONE, INCLUDING FAMILY AND FRIENDS, PHYSICALLY HURT YOU?: NEVER

## 2024-03-28 SDOH — HEALTH STABILITY: GENERAL
BECAUSE OF A PHYSICAL, MENTAL, OR EMOTIONAL CONDITION, DO YOU HAVE SERIOUS DIFFICULTY CONCENTRATING, REMEMBERING OR MAKING DECISIONS?: NO

## 2024-03-28 SDOH — ECONOMIC STABILITY: HOUSING INSECURITY: WHAT IS YOUR LIVING SITUATION TODAY?: HOUSE

## 2024-03-28 SDOH — HEALTH STABILITY: PHYSICAL HEALTH: DO YOU HAVE SERIOUS DIFFICULTY WALKING OR CLIMBING STAIRS?: NO

## 2024-03-28 SDOH — HEALTH STABILITY: GENERAL: BECAUSE OF A PHYSICAL, MENTAL, OR EMOTIONAL CONDITION, DO YOU HAVE DIFFICULTY DOING ERRANDS ALONE?: NO

## 2024-03-28 SDOH — ECONOMIC STABILITY: GENERAL

## 2024-03-28 SDOH — SOCIAL STABILITY: SOCIAL INSECURITY: HOW OFTEN DOES ANYONE, INCLUDING FAMILY AND FRIENDS, THREATEN YOU WITH HARM?: NEVER

## 2024-03-28 SDOH — SOCIAL STABILITY: SOCIAL NETWORK: SUPPORT SYSTEMS: SIBLINGS;FRIENDS;FAMILY MEMBERS

## 2024-03-28 SDOH — ECONOMIC STABILITY: GENERAL: WOULD YOU LIKE HELP WITH ANY OF THE FOLLOWING NEEDS?: I DON'T WANT HELP WITH ANY OF THESE

## 2024-03-28 ASSESSMENT — ACTIVITIES OF DAILY LIVING (ADL)
ADL_BEFORE_ADMISSION: INDEPENDENT
RECENT_DECLINE_ADL: NO
ADL_SHORT_OF_BREATH: NO
ADL_SCORE: 12

## 2024-03-28 ASSESSMENT — LIFESTYLE VARIABLES
ALCOHOL_USE_STATUS: NO OR LOW RISK WITH VALIDATED TOOL
AUDIT-C TOTAL SCORE: 0
HOW OFTEN DO YOU HAVE A DRINK CONTAINING ALCOHOL: NEVER
HOW MANY STANDARD DRINKS CONTAINING ALCOHOL DO YOU HAVE ON A TYPICAL DAY: 0,1 OR 2
HOW OFTEN DO YOU HAVE 6 OR MORE DRINKS ON ONE OCCASION: NEVER

## 2024-03-28 ASSESSMENT — ENCOUNTER SYMPTOMS
EYES NEGATIVE: 1
NEUROLOGICAL NEGATIVE: 1
CONSTITUTIONAL NEGATIVE: 1
RESPIRATORY NEGATIVE: 1
ABDOMINAL PAIN: 1
PSYCHIATRIC NEGATIVE: 1

## 2024-03-28 ASSESSMENT — PATIENT HEALTH QUESTIONNAIRE - PHQ9
SUM OF ALL RESPONSES TO PHQ9 QUESTIONS 1 AND 2: 0
IS PATIENT ABLE TO COMPLETE PHQ2 OR PHQ9: YES
CLINICAL INTERPRETATION OF PHQ2 SCORE: NO FURTHER SCREENING NEEDED

## 2024-03-28 ASSESSMENT — PAIN SCALES - GENERAL: PAINLEVEL_OUTOF10: 5

## 2024-03-28 NOTE — TELEPHONE ENCOUNTER
She needs to discuss this with the surgeon that did her surgery--they usually instruct about pain control; she may need an appt with the surgeon if this is more pain than expected

## 2024-03-28 NOTE — TELEPHONE ENCOUNTER
Spoke with patient. Relayed MD message.   Pt went to ER and received Rx for pain medication - pain is controlled with her current prescriptions from the emergency room.   Advised pt to follow up with Surgeon.   Pt verbalized understanding.

## 2024-03-30 ENCOUNTER — TELEPHONE (OUTPATIENT)
Dept: CARE COORDINATION | Age: 34
End: 2024-03-30

## 2024-03-30 LAB
BACTERIA BLD CULT: NORMAL
BACTERIA BLD CULT: NORMAL

## 2024-03-31 ENCOUNTER — TELEPHONE (OUTPATIENT)
Dept: CARE COORDINATION | Age: 34
End: 2024-03-31

## 2024-04-02 LAB
BACTERIA BLD CULT: NORMAL
BACTERIA BLD CULT: NORMAL

## 2024-04-06 ENCOUNTER — TELEPHONE (OUTPATIENT)
Dept: CARE COORDINATION | Age: 34
End: 2024-04-06

## 2024-04-07 ENCOUNTER — TELEPHONE (OUTPATIENT)
Dept: CARE COORDINATION | Age: 34
End: 2024-04-07

## 2024-04-13 ENCOUNTER — TELEPHONE (OUTPATIENT)
Dept: CARE COORDINATION | Age: 34
End: 2024-04-13

## 2024-04-14 ENCOUNTER — TELEPHONE (OUTPATIENT)
Dept: CARE COORDINATION | Age: 34
End: 2024-04-14

## 2024-04-20 ENCOUNTER — TELEPHONE (OUTPATIENT)
Dept: CARE COORDINATION | Age: 34
End: 2024-04-20

## 2024-04-27 ENCOUNTER — TELEPHONE (OUTPATIENT)
Dept: CARE COORDINATION | Age: 34
End: 2024-04-27

## 2024-06-11 RX ORDER — MULTIVITAMIN WITH IRON
50 TABLET ORAL DAILY
Refills: 0 | Status: CANCELLED | OUTPATIENT
Start: 2024-06-11

## 2024-06-11 RX ORDER — MULTIVIT-MIN/IRON/FOLIC ACID/K 18-600-40
CAPSULE ORAL
Qty: 30 CAPSULE | Refills: 0 | Status: CANCELLED | OUTPATIENT
Start: 2024-06-11

## 2024-07-31 DIAGNOSIS — F90.9 ATTENTION DEFICIT HYPERACTIVITY DISORDER (ADHD), UNSPECIFIED ADHD TYPE: ICD-10-CM

## 2024-07-31 RX ORDER — DEXTROAMPHETAMINE SACCHARATE, AMPHETAMINE ASPARTATE MONOHYDRATE, DEXTROAMPHETAMINE SULFATE AND AMPHETAMINE SULFATE 5; 5; 5; 5 MG/1; MG/1; MG/1; MG/1
20 CAPSULE, EXTENDED RELEASE ORAL DAILY
Qty: 30 CAPSULE | Refills: 0 | OUTPATIENT
Start: 2024-07-31 | End: 2024-08-30

## 2024-07-31 NOTE — TELEPHONE ENCOUNTER
Current refill request refused due to refill is either a duplicate request or has active refills at the pharmacy.  Check previous templates.    Requested Prescriptions     Refused Prescriptions Disp Refills    Amphetamine-Dextroamphet ER (ADDERALL XR) 20 MG Oral Capsule SR 24 Hr 30 capsule 0     Sig: Take 1 capsule (20 mg total) by mouth daily.     Refused By: AUDREY SADNHU     Reason for Refusal: Patient has requested refill too soon      90-day panel sent on 6/11/2024. 2nd Rx with start date of 7/12/24 and last Rx with a start date of 8/12/24. Patient should have refills remaining at the pharmacy.

## 2025-01-02 ENCOUNTER — TELEPHONE (OUTPATIENT)
Dept: INTERNAL MEDICINE CLINIC | Facility: CLINIC | Age: 35
End: 2025-01-02

## 2025-01-02 DIAGNOSIS — F90.9 ATTENTION DEFICIT HYPERACTIVITY DISORDER (ADHD), UNSPECIFIED ADHD TYPE: ICD-10-CM

## 2025-01-02 RX ORDER — DEXTROAMPHETAMINE SACCHARATE, AMPHETAMINE ASPARTATE MONOHYDRATE, DEXTROAMPHETAMINE SULFATE AND AMPHETAMINE SULFATE 5; 5; 5; 5 MG/1; MG/1; MG/1; MG/1
20 CAPSULE, EXTENDED RELEASE ORAL DAILY
Qty: 30 CAPSULE | Refills: 0 | Status: CANCELLED | OUTPATIENT
Start: 2025-01-02 | End: 2025-02-01

## 2025-01-03 RX ORDER — DEXTROAMPHETAMINE SACCHARATE, AMPHETAMINE ASPARTATE MONOHYDRATE, DEXTROAMPHETAMINE SULFATE AND AMPHETAMINE SULFATE 5; 5; 5; 5 MG/1; MG/1; MG/1; MG/1
20 CAPSULE, EXTENDED RELEASE ORAL DAILY
Qty: 30 CAPSULE | Refills: 0 | Status: SHIPPED | OUTPATIENT
Start: 2025-01-03 | End: 2025-02-02

## 2025-01-03 RX ORDER — DEXTROAMPHETAMINE SACCHARATE, AMPHETAMINE ASPARTATE MONOHYDRATE, DEXTROAMPHETAMINE SULFATE AND AMPHETAMINE SULFATE 5; 5; 5; 5 MG/1; MG/1; MG/1; MG/1
20 CAPSULE, EXTENDED RELEASE ORAL DAILY
Qty: 30 CAPSULE | Refills: 0 | Status: SHIPPED | OUTPATIENT
Start: 2025-03-06 | End: 2025-04-05

## 2025-01-03 RX ORDER — DEXTROAMPHETAMINE SACCHARATE, AMPHETAMINE ASPARTATE MONOHYDRATE, DEXTROAMPHETAMINE SULFATE AND AMPHETAMINE SULFATE 5; 5; 5; 5 MG/1; MG/1; MG/1; MG/1
20 CAPSULE, EXTENDED RELEASE ORAL DAILY
Qty: 30 CAPSULE | Refills: 0 | Status: SHIPPED | OUTPATIENT
Start: 2025-02-03 | End: 2025-03-05

## 2025-01-03 NOTE — TELEPHONE ENCOUNTER
To Dr. PORTER-    The above refill request is for a controlled substance.  Please review pended medication order.  LOV: 3/24  Prescribed:30 12/2  : \"     Mychart sent to schedule visit

## 2025-02-04 ENCOUNTER — TELEPHONE (OUTPATIENT)
Dept: INTERNAL MEDICINE CLINIC | Facility: CLINIC | Age: 35
End: 2025-02-04

## 2025-02-04 NOTE — TELEPHONE ENCOUNTER
Copy of the signed letter faxed to [facility] at [phone]. Fax confirmation received.

## 2025-02-04 NOTE — TELEPHONE ENCOUNTER
Patient calling back.  She spoke to [facility] [employer] [facility].    They are requesting signed letter to be faxed to them at [phone].

## 2025-02-04 NOTE — TELEPHONE ENCOUNTER
Patient calling for work note.  Asking for note to state she can not be near [employer]. 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    Patient is allergic to chemical [employer] at work [facility] [employer]. Work is aware she is having reaction to chemical.  Has even been to their urgent for treatment.  Patient was at urgent care [facility] 3 weeks ago.      Allergic reaction of rash on arms, nose clogged and difficult to breath.  She does wear a mask when near the chemical.    Patient states \"she feels they are purposely placing her near the chemical.\"    Best call back number [phone]

## 2025-02-04 NOTE — TELEPHONE ENCOUNTER
Please refer the patient that I provided a letter via [employer].  If she needs a signed copy faxed specifically, we can provide that if she has contact information for recipient

## 2025-02-26 ENCOUNTER — WALK IN (OUTPATIENT)
Dept: URGENT CARE | Age: 35
End: 2025-02-26
Attending: PEDIATRICS

## 2025-02-26 VITALS
SYSTOLIC BLOOD PRESSURE: 110 MMHG | DIASTOLIC BLOOD PRESSURE: 74 MMHG | OXYGEN SATURATION: 98 % | RESPIRATION RATE: 16 BRPM | TEMPERATURE: 98.1 F | HEART RATE: 90 BPM

## 2025-02-26 DIAGNOSIS — R10.9 ABDOMINAL PAIN, UNSPECIFIED ABDOMINAL LOCATION: Primary | ICD-10-CM

## 2025-02-26 DIAGNOSIS — R10.2 PELVIC PAIN IN FEMALE: ICD-10-CM

## 2025-02-26 LAB
APPEARANCE, POC: CLEAR
B-HCG UR QL: NEGATIVE
BILIRUB UR QL STRIP: NEGATIVE
COLOR UR: YELLOW
GLUCOSE UR-MCNC: NEGATIVE MG/DL
HGB UR QL STRIP: NEGATIVE
INTERNAL PROCEDURAL CONTROLS ACCEPTABLE: YES
KETONES UR STRIP-MCNC: NEGATIVE MG/DL
LEUKOCYTE ESTERASE UR QL STRIP: NEGATIVE
NITRITE UR QL STRIP: NEGATIVE
PH UR: 5.5 [PH] (ref 5–7)
PROT UR-MCNC: NEGATIVE MG/DL
SP GR UR: 1.01 (ref 1–1.03)
TEST LOT EXPIRATION DATE: NORMAL
TEST LOT NUMBER: NORMAL
UROBILINOGEN UR-MCNC: 0.2 MG/DL (ref 0–1)

## 2025-02-26 PROCEDURE — 81003 URINALYSIS AUTO W/O SCOPE: CPT | Performed by: EMERGENCY MEDICINE

## 2025-02-26 PROCEDURE — 81513 NFCT DS BV RNA VAG FLU ALG: CPT | Performed by: EMERGENCY MEDICINE

## 2025-02-26 PROCEDURE — 81025 URINE PREGNANCY TEST: CPT | Performed by: EMERGENCY MEDICINE

## 2025-02-26 PROCEDURE — 87481 CANDIDA DNA AMP PROBE: CPT | Performed by: EMERGENCY MEDICINE

## 2025-02-26 PROCEDURE — 87491 CHLMYD TRACH DNA AMP PROBE: CPT | Performed by: EMERGENCY MEDICINE

## 2025-02-26 RX ORDER — DEXTROAMPHETAMINE SACCHARATE, AMPHETAMINE ASPARTATE MONOHYDRATE, DEXTROAMPHETAMINE SULFATE AND AMPHETAMINE SULFATE 5; 5; 5; 5 MG/1; MG/1; MG/1; MG/1
1 CAPSULE, EXTENDED RELEASE ORAL DAILY
COMMUNITY
Start: 2025-01-03 | End: 2025-03-05

## 2025-02-27 ENCOUNTER — APPOINTMENT (OUTPATIENT)
Dept: ULTRASOUND IMAGING | Age: 35
End: 2025-02-27
Attending: NURSE PRACTITIONER

## 2025-02-27 ENCOUNTER — HOSPITAL ENCOUNTER (EMERGENCY)
Age: 35
Discharge: HOME | End: 2025-02-27

## 2025-02-27 VITALS
OXYGEN SATURATION: 98 % | BODY MASS INDEX: 38.37 KG/M2 | SYSTOLIC BLOOD PRESSURE: 110 MMHG | HEART RATE: 78 BPM | DIASTOLIC BLOOD PRESSURE: 72 MMHG | TEMPERATURE: 98.2 F | RESPIRATION RATE: 16 BRPM | WEIGHT: 238.76 LBS | HEIGHT: 66 IN

## 2025-02-27 DIAGNOSIS — R10.2 VAGINAL PAIN: Primary | ICD-10-CM

## 2025-02-27 LAB
ANION GAP SERPL CALC-SCNC: 15 MMOL/L (ref 7–19)
APPEARANCE UR: CLEAR
BACTERIAL VAGINOSIS VAG-IMP: NOT DETECTED
BASOPHILS # BLD: 0 K/MCL (ref 0–0.3)
BASOPHILS NFR BLD: 1 %
BILIRUB UR QL STRIP: NEGATIVE
BUN SERPL-MCNC: 10 MG/DL (ref 6–20)
BUN/CREAT SERPL: 19 (ref 7–25)
C ALBICANS DNA VAG QL NAA+PROBE: NOT DETECTED
C GLABRATA DNA VAG QL NAA+PROBE: NOT DETECTED
C TRACH RRNA CVX QL NAA+PROBE: NEGATIVE
CALCIUM SERPL-MCNC: 8.4 MG/DL (ref 8.4–10.2)
CHLORIDE SERPL-SCNC: 106 MMOL/L (ref 97–110)
CLUE CELLS VAG QL WET PREP: NORMAL
CO2 SERPL-SCNC: 25 MMOL/L (ref 21–32)
COLOR UR: NORMAL
CREAT SERPL-MCNC: 0.52 MG/DL (ref 0.51–0.95)
DEPRECATED RDW RBC: 40.4 FL (ref 39–50)
EGFRCR SERPLBLD CKD-EPI 2021: >90 ML/MIN/{1.73_M2}
EOSINOPHIL # BLD: 0.5 K/MCL (ref 0–0.5)
EOSINOPHIL NFR BLD: 8 %
ERYTHROCYTE [DISTWIDTH] IN BLOOD: 13 % (ref 11–15)
FASTING DURATION TIME PATIENT: ABNORMAL H
GLUCOSE SERPL-MCNC: 107 MG/DL (ref 70–99)
GLUCOSE UR STRIP-MCNC: NEGATIVE MG/DL
HCG UR QL: NEGATIVE
HCT VFR BLD CALC: 37.1 % (ref 36–46.5)
HGB BLD-MCNC: 12.6 G/DL (ref 12–15.5)
HGB UR QL STRIP: NEGATIVE
IMM GRANULOCYTES # BLD AUTO: 0 K/MCL (ref 0–0.2)
IMM GRANULOCYTES # BLD: 0 %
KETONES UR STRIP-MCNC: NEGATIVE MG/DL
LEUKOCYTE ESTERASE UR QL STRIP: NEGATIVE
LYMPHOCYTES # BLD: 2.1 K/MCL (ref 1–4.8)
LYMPHOCYTES NFR BLD: 36 %
Lab: NORMAL
MCH RBC QN AUTO: 29.3 PG (ref 26–34)
MCHC RBC AUTO-ENTMCNC: 34 G/DL (ref 32–36.5)
MCV RBC AUTO: 86.3 FL (ref 78–100)
MONOCYTES # BLD: 0.4 K/MCL (ref 0.3–0.9)
MONOCYTES NFR BLD: 7 %
N GONORRHOEA RRNA CVX QL NAA+PROBE: NEGATIVE
NEUTROPHILS # BLD: 2.7 K/MCL (ref 1.8–7.7)
NEUTROPHILS NFR BLD: 48 %
NITRITE UR QL STRIP: NEGATIVE
NRBC BLD MANUAL-RTO: 0 /100 WBC
PH UR STRIP: 7 [PH] (ref 5–7)
PLATELET # BLD AUTO: 369 K/MCL (ref 140–450)
POTASSIUM SERPL-SCNC: 4 MMOL/L (ref 3.4–5.1)
PROT UR STRIP-MCNC: NEGATIVE MG/DL
RBC # BLD: 4.3 MIL/MCL (ref 4–5.2)
SERVICE CMNT-IMP: NORMAL
SERVICE CMNT-IMP: NORMAL
SODIUM SERPL-SCNC: 142 MMOL/L (ref 135–145)
SP GR UR STRIP: 1.02 (ref 1–1.03)
T VAGINALIS DNA VAG QL NAA+PROBE: NOT DETECTED
T VAGINALIS VAG QL WET PREP: NORMAL
UROBILINOGEN UR STRIP-MCNC: 0.2 MG/DL
WBC # BLD: 5.8 K/MCL (ref 4.2–11)
YEAST VAG QL WET PREP: NORMAL

## 2025-02-27 PROCEDURE — 84703 CHORIONIC GONADOTROPIN ASSAY: CPT

## 2025-02-27 PROCEDURE — 87491 CHLMYD TRACH DNA AMP PROBE: CPT | Performed by: NURSE PRACTITIONER

## 2025-02-27 PROCEDURE — 36415 COLL VENOUS BLD VENIPUNCTURE: CPT | Performed by: NURSE PRACTITIONER

## 2025-02-27 PROCEDURE — 85025 COMPLETE CBC W/AUTO DIFF WBC: CPT | Performed by: NURSE PRACTITIONER

## 2025-02-27 PROCEDURE — 80048 BASIC METABOLIC PNL TOTAL CA: CPT | Performed by: NURSE PRACTITIONER

## 2025-02-27 PROCEDURE — 87210 SMEAR WET MOUNT SALINE/INK: CPT | Performed by: NURSE PRACTITIONER

## 2025-02-27 PROCEDURE — 93975 VASCULAR STUDY: CPT

## 2025-02-27 PROCEDURE — 81003 URINALYSIS AUTO W/O SCOPE: CPT | Performed by: EMERGENCY MEDICINE

## 2025-02-27 RX ORDER — KETOROLAC TROMETHAMINE 15 MG/ML
15 INJECTION, SOLUTION INTRAMUSCULAR; INTRAVENOUS ONCE
Status: DISCONTINUED | OUTPATIENT
Start: 2025-02-27 | End: 2025-02-27 | Stop reason: HOSPADM

## 2025-02-28 LAB
C TRACH RRNA CVX QL NAA+PROBE: NEGATIVE
Lab: NORMAL
N GONORRHOEA RRNA CVX QL NAA+PROBE: NEGATIVE

## 2025-06-25 ENCOUNTER — TELEPHONE (OUTPATIENT)
Dept: INTERNAL MEDICINE CLINIC | Facility: CLINIC | Age: 35
End: 2025-06-25

## 2025-06-25 NOTE — TELEPHONE ENCOUNTER
Received request from [employer] for medical records.  Faxed to [employer]  Copy in drawer next to fax machine  Original bar coded

## 2025-07-20 ENCOUNTER — WALK IN (OUTPATIENT)
Dept: URGENT CARE | Age: 35
End: 2025-07-20

## 2025-07-20 VITALS
SYSTOLIC BLOOD PRESSURE: 120 MMHG | TEMPERATURE: 97.6 F | HEART RATE: 82 BPM | DIASTOLIC BLOOD PRESSURE: 82 MMHG | OXYGEN SATURATION: 98 % | RESPIRATION RATE: 16 BRPM

## 2025-07-20 DIAGNOSIS — N39.0 ACUTE UTI: ICD-10-CM

## 2025-07-20 DIAGNOSIS — R30.0 DYSURIA: Primary | ICD-10-CM

## 2025-07-20 LAB
APPEARANCE UR: ABNORMAL
APPEARANCE, POC: ABNORMAL
BACTERIA #/AREA URNS HPF: ABNORMAL /HPF
BILIRUB UR QL STRIP: NEGATIVE
BILIRUB UR QL STRIP: NEGATIVE
COLOR UR: YELLOW
COLOR UR: YELLOW
GLUCOSE UR STRIP-MCNC: NEGATIVE MG/DL
GLUCOSE UR-MCNC: NEGATIVE MG/DL
HGB UR QL STRIP: ABNORMAL
HGB UR QL STRIP: ABNORMAL
HYALINE CASTS #/AREA URNS LPF: ABNORMAL /LPF
KETONES UR STRIP-MCNC: NEGATIVE MG/DL
KETONES UR STRIP-MCNC: NEGATIVE MG/DL
LEUKOCYTE ESTERASE UR QL STRIP: ABNORMAL
LEUKOCYTE ESTERASE UR QL STRIP: ABNORMAL
MUCOUS THREADS URNS QL MICRO: PRESENT
NITRITE UR QL STRIP: NEGATIVE
NITRITE UR QL STRIP: NEGATIVE
PH UR STRIP: 6 [PH] (ref 5–7)
PH UR: 6 [PH] (ref 5–7)
PROT UR STRIP-MCNC: ABNORMAL MG/DL
PROT UR-MCNC: ABNORMAL MG/DL
RBC #/AREA URNS HPF: >100 /HPF
SP GR UR STRIP: 1.03 (ref 1–1.03)
SP GR UR: 1.02 (ref 1–1.03)
SQUAMOUS #/AREA URNS HPF: ABNORMAL /HPF
TEST LOT EXPIRATION DATE: ABNORMAL
TEST LOT NUMBER: ABNORMAL
UROBILINOGEN UR STRIP-MCNC: 0.2 MG/DL
UROBILINOGEN UR-MCNC: 0.2 MG/DL (ref 0–1)
WBC #/AREA URNS HPF: ABNORMAL /HPF

## 2025-07-20 RX ORDER — SULFAMETHOXAZOLE AND TRIMETHOPRIM 800; 160 MG/1; MG/1
1 TABLET ORAL 2 TIMES DAILY
Qty: 10 TABLET | Refills: 0 | Status: SHIPPED | OUTPATIENT
Start: 2025-07-20 | End: 2025-07-25

## 2025-07-20 ASSESSMENT — PAIN SCALES - GENERAL: PAINLEVEL_OUTOF10: 9

## 2025-07-22 LAB — BACTERIA UR CULT: NORMAL

## (undated) DIAGNOSIS — Z12.31 ENCOUNTER FOR SCREENING MAMMOGRAM FOR MALIGNANT NEOPLASM OF BREAST: ICD-10-CM

## (undated) DIAGNOSIS — E66.01 MORBID OBESITY WITH BMI OF 40.0-44.9, ADULT (HCC): ICD-10-CM

## (undated) DIAGNOSIS — Z11.3 SCREEN FOR STD (SEXUALLY TRANSMITTED DISEASE): Primary | ICD-10-CM

## (undated) DIAGNOSIS — Z12.31 ENCOUNTER FOR SCREENING MAMMOGRAM FOR MALIGNANT NEOPLASM OF BREAST: Primary | ICD-10-CM

## (undated) NOTE — LETTER
Date: 6/8/2021    Patient Name: Cleopatra Montano          To Whom it may concern: This letter has been written at the patient's request. The above patient was seen at the [facility] for treatment of a medical condition.     This patient should b

## (undated) NOTE — LETTER
Date & Time: 8/19/2022, 10:25 PM  Patient: Lynn Hanks  Encounter Provider(s):    Derek Elena MD       To Whom It May Concern:    Lynn Hanks was seen and treated in our department on 8/19/2022. She is excused from work for next 3 days.     If you have any questions or concerns, please do not hesitate to call.        _____________________________  Physician/APC Signature

## (undated) NOTE — LETTER
Date: 11/17/2021    Patient Name: Obed Miles          To Whom it may concern: This letter has been written at the patient's request. The above patient was seen at the [facility] for treatment of a medical condition.         Sincerely,

## (undated) NOTE — LETTER
5/31/2022          To Whom It May Concern:    Juvenal Peter is currently under my medical care and may not return to work at this time. Please excuse Sinda Keepers for her absence from work starting on 5/24/2022 through 6/2/2022 as she has been recovering from an undisclosed medical condition. She may return to work on Friday, 6/3/2022. Activity is restricted as follows: none. If you require additional information please contact our office.         Sincerely,          Shamika Seo MD          Document generated by:  Shamika Seo MD

## (undated) NOTE — LETTER
3/16/2022            To Whom It May Concern:    Bestnoni Alicia was seen in my office today. Patient is able to return to work 03/16/2022. No heavy lifting for two days.      Sincerely,    Sherly Colunga, [address] [place] [address], [address] [place] [address]  [phone]

## (undated) NOTE — LETTER
[address] [facility]. [facility] [address] [place] [address], [id number] [place] [address] [phone]            November 16, 2021      Dorcas Barcenas  [address] [place] [address]      Dear Remington Rowe

## (undated) NOTE — LETTER
11/3/2023              Buffy Sheppard        [address] [id number]         Dear Hortencia Anders,    [facility] records indicate that the tests ordered for you by Rodolfo Rodriguez CNM  have not been done. If you have, in fact, already completed the tests or you do not wish to have the tests done, please contact our office at [address] [place] [address]. Otherwise, please proceed with the testing. Enclosed is a duplicate order for your convenience.         Sincerely,    Rodolfo Rodriguez CNM  [facility], [facility], [facility] [address]  [phone]

## (undated) NOTE — LETTER
2/4/2025          To Whom It May Concern:    Buffy Sheppard is currently under my medical care and it is with her permission that I provide this correspondence.     She has an allergy to [employer] chemical for sterilization of instruments.  Despite the use of recommended protective equipment, she develops allergic reactions with exposure to the chemical.  For this reason, she should avoid tasks and activities that can result in exposure to this substance.      If you require additional information please contact our office.        Sincerely,    Ton Gunter MD          Document generated by:  Ton Gunter MD

## (undated) NOTE — LETTER
5/27/2022          To Whom It May Concern:    Juvenal Peter is currently under my medical care and may not return to work at this time. Please excuse Sinda Keepers for absence from work starting on 5/27/2022 as she is recovering from an undisclosed injury. She will need a follow-up visit for re-evaluation prior to returning to work. If you require additional information please contact our office.         Sincerely,          Shamika Seo MD          Document generated by:  Shamika Seo MD